# Patient Record
Sex: FEMALE | Race: WHITE | HISPANIC OR LATINO | Employment: UNEMPLOYED
[De-identification: names, ages, dates, MRNs, and addresses within clinical notes are randomized per-mention and may not be internally consistent; named-entity substitution may affect disease eponyms.]

---

## 2017-10-30 ENCOUNTER — RECORDS - HEALTHEAST (OUTPATIENT)
Dept: ADMINISTRATIVE | Facility: OTHER | Age: 57
End: 2017-10-30

## 2017-11-06 ENCOUNTER — HOSPITAL ENCOUNTER (OUTPATIENT)
Dept: NUCLEAR MEDICINE | Facility: CLINIC | Age: 57
Discharge: HOME | End: 2017-11-06
Attending: PHYSICIAN ASSISTANT

## 2017-11-06 ENCOUNTER — COMMUNICATION - HEALTHEAST (OUTPATIENT)
Dept: TELEHEALTH | Facility: CLINIC | Age: 57
End: 2017-11-06

## 2017-11-06 DIAGNOSIS — R10.11 RUQ PAIN: ICD-10-CM

## 2018-06-29 ENCOUNTER — RECORDS - HEALTHEAST (OUTPATIENT)
Dept: ADMINISTRATIVE | Facility: OTHER | Age: 58
End: 2018-06-29

## 2018-07-03 ENCOUNTER — AMBULATORY - HEALTHEAST (OUTPATIENT)
Dept: SURGERY | Facility: CLINIC | Age: 58
End: 2018-07-03

## 2018-07-03 DIAGNOSIS — R22.1 LOCALIZED SWELLING, MASS AND LUMP, NECK: ICD-10-CM

## 2018-07-06 ENCOUNTER — COMMUNICATION - HEALTHEAST (OUTPATIENT)
Dept: ADMINISTRATIVE | Facility: CLINIC | Age: 58
End: 2018-07-06

## 2018-07-17 ENCOUNTER — COMMUNICATION - HEALTHEAST (OUTPATIENT)
Dept: SURGERY | Facility: CLINIC | Age: 58
End: 2018-07-17

## 2018-08-01 ENCOUNTER — OFFICE VISIT - HEALTHEAST (OUTPATIENT)
Dept: SURGERY | Facility: CLINIC | Age: 58
End: 2018-08-01

## 2018-08-01 DIAGNOSIS — R10.13 ABDOMINAL PAIN, EPIGASTRIC: ICD-10-CM

## 2018-08-01 DIAGNOSIS — K21.9 GASTROESOPHAGEAL REFLUX DISEASE, ESOPHAGITIS PRESENCE NOT SPECIFIED: ICD-10-CM

## 2018-08-01 DIAGNOSIS — R10.11 RUQ PAIN: ICD-10-CM

## 2018-08-01 ASSESSMENT — MIFFLIN-ST. JEOR: SCORE: 1366.94

## 2019-06-03 ENCOUNTER — RECORDS - HEALTHEAST (OUTPATIENT)
Dept: ADMINISTRATIVE | Facility: OTHER | Age: 59
End: 2019-06-03

## 2019-06-10 ENCOUNTER — HOSPITAL ENCOUNTER (OUTPATIENT)
Dept: CT IMAGING | Facility: CLINIC | Age: 59
Discharge: HOME | End: 2019-06-10
Attending: INTERNAL MEDICINE

## 2019-06-10 DIAGNOSIS — R10.11 RUQ PAIN: ICD-10-CM

## 2021-03-23 ENCOUNTER — RECORDS - HEALTHEAST (OUTPATIENT)
Dept: ADMINISTRATIVE | Facility: OTHER | Age: 61
End: 2021-03-23

## 2021-04-21 ENCOUNTER — COMMUNICATION - HEALTHEAST (OUTPATIENT)
Dept: TELEHEALTH | Facility: CLINIC | Age: 61
End: 2021-04-21

## 2021-04-21 ENCOUNTER — HOSPITAL ENCOUNTER (OUTPATIENT)
Dept: NUCLEAR MEDICINE | Facility: CLINIC | Age: 61
Discharge: HOME | End: 2021-04-21
Attending: INTERNAL MEDICINE

## 2021-04-21 DIAGNOSIS — R10.11 RUQ PAIN: ICD-10-CM

## 2021-05-17 ENCOUNTER — AMBULATORY - HEALTHEAST (OUTPATIENT)
Dept: SURGERY | Facility: CLINIC | Age: 61
End: 2021-05-17

## 2021-05-17 ENCOUNTER — RECORDS - HEALTHEAST (OUTPATIENT)
Dept: ADMINISTRATIVE | Facility: OTHER | Age: 61
End: 2021-05-17

## 2021-05-17 DIAGNOSIS — Z11.59 ENCOUNTER FOR SCREENING FOR OTHER VIRAL DISEASES: ICD-10-CM

## 2021-05-19 ENCOUNTER — AMBULATORY - HEALTHEAST (OUTPATIENT)
Dept: MULTI SPECIALTY CLINIC | Facility: CLINIC | Age: 61
End: 2021-05-19

## 2021-05-19 ENCOUNTER — TRANSFERRED RECORDS (OUTPATIENT)
Dept: HEALTH INFORMATION MANAGEMENT | Facility: CLINIC | Age: 61
End: 2021-05-19

## 2021-05-19 LAB
CREAT SERPL-MCNC: 0.72 MG/DL (ref 0.5–0.99)
GFR SERPL CREATININE-BSD FRML MDRD: 91 ML/MIN/1.73M2
GLUCOSE SERPL-MCNC: 126 MG/DL (ref 65–99)
HBA1C MFR BLD: 6.2 % (ref 0–5.7)
POTASSIUM SERPL-SCNC: 4 MMOL/L (ref 3.5–5.3)

## 2021-05-22 ENCOUNTER — AMBULATORY - HEALTHEAST (OUTPATIENT)
Dept: FAMILY MEDICINE | Facility: CLINIC | Age: 61
End: 2021-05-22

## 2021-05-22 DIAGNOSIS — Z11.59 ENCOUNTER FOR SCREENING FOR OTHER VIRAL DISEASES: ICD-10-CM

## 2021-05-22 LAB
SARS-COV-2 PCR COMMENT: NORMAL
SARS-COV-2 RNA SPEC QL NAA+PROBE: NEGATIVE
SARS-COV-2 VIRUS SPECIMEN SOURCE: NORMAL

## 2021-05-23 ENCOUNTER — COMMUNICATION - HEALTHEAST (OUTPATIENT)
Dept: SCHEDULING | Facility: CLINIC | Age: 61
End: 2021-05-23

## 2021-05-24 ENCOUNTER — RECORDS - HEALTHEAST (OUTPATIENT)
Dept: ADMINISTRATIVE | Facility: OTHER | Age: 61
End: 2021-05-24

## 2021-05-24 ASSESSMENT — MIFFLIN-ST. JEOR: SCORE: 1294.23

## 2021-05-25 ENCOUNTER — RECORDS - HEALTHEAST (OUTPATIENT)
Dept: ADMINISTRATIVE | Facility: OTHER | Age: 61
End: 2021-05-25

## 2021-05-26 ENCOUNTER — ANESTHESIA - HEALTHEAST (OUTPATIENT)
Dept: SURGERY | Facility: CLINIC | Age: 61
End: 2021-05-26

## 2021-05-26 ENCOUNTER — SURGERY - HEALTHEAST (OUTPATIENT)
Dept: SURGERY | Facility: CLINIC | Age: 61
End: 2021-05-26

## 2021-05-26 ENCOUNTER — RECORDS - HEALTHEAST (OUTPATIENT)
Dept: ADMINISTRATIVE | Facility: OTHER | Age: 61
End: 2021-05-26

## 2021-05-26 ASSESSMENT — MIFFLIN-ST. JEOR: SCORE: 1263.38

## 2021-05-31 ENCOUNTER — RECORDS - HEALTHEAST (OUTPATIENT)
Dept: ADMINISTRATIVE | Facility: CLINIC | Age: 61
End: 2021-05-31

## 2021-06-01 VITALS — WEIGHT: 172 LBS | BODY MASS INDEX: 27.64 KG/M2 | HEIGHT: 66 IN

## 2021-06-03 ENCOUNTER — COMMUNICATION - HEALTHEAST (OUTPATIENT)
Dept: INFECTIOUS DISEASES | Facility: CLINIC | Age: 61
End: 2021-06-03

## 2021-06-05 VITALS — WEIGHT: 161 LBS | BODY MASS INDEX: 25.99 KG/M2

## 2021-06-16 PROBLEM — E03.9 ACQUIRED HYPOTHYROIDISM: Chronic | Status: ACTIVE | Noted: 2021-05-29

## 2021-06-16 PROBLEM — R10.9 ABDOMINAL PAIN: Status: ACTIVE | Noted: 2021-05-29

## 2021-06-16 PROBLEM — E11.9 TYPE 2 DIABETES MELLITUS, WITHOUT LONG-TERM CURRENT USE OF INSULIN (H): Chronic | Status: ACTIVE | Noted: 2021-05-29

## 2021-06-19 NOTE — PROGRESS NOTES
GENERAL SURGICAL CONSULTATION    I was requested by Stephanie Becker MD to consult on this pt to evaluate them for Gall Bladder issues    HPI:  This is a 58 y.o. female here today with a recurrent RUQ pain.  The pain that she has is worse after eating. The pain also comes on when she is nervous.  She sometimes gets nausea but not vomiting.  She often gets abdominal pain and then she has difficulty with abdominal distention.   She has had these symptoms for years but they seem to be getting worse.  She has been assessed with GI and has been advised to stop taking milk and milk products.  She also is taking PPI's which she thinks is helpful but she is still having symptoms.  Patient also describes how she gets epigastric pain and [place] better taste within her mouth that makes her feel the gastric acid is coming up her esophagus.    Allergies:Penicillins    Past Medical History:   Diagnosis Date     CAP (community acquired pneumonia)      Hyperlipidemia      Hypertension        Past Surgical History:   Procedure Laterality Date     TUBAL LIGATION         CURRENT MEDS:  Current Outpatient Prescriptions   Medication Sig Dispense Refill     aspirin 325 MG tablet Take 325 mg by mouth daily.       atenolol (TENORMIN) 50 MG tablet Take 0.5 tablets (25 mg total) by mouth daily.  0     atorvastatin (LIPITOR) 10 MG tablet Take 10 mg by mouth daily.        losartan (COZAAR) 50 MG tablet Take 50 mg by mouth daily.       No current facility-administered medications for this visit.        Family History   Problem Relation Age of Onset     Heart attack Brother      Diabetes Mother      Hyperlipidemia Father      Family history is not pertinent to this patients Chief Complaint.     reports that she has never smoked. She has never used smokeless tobacco. She reports that she does not drink alcohol or use illicit drugs.    Review of Systems -   10 point Review of systems is negative except for; as mentioned above in HPI and PMHx    BP  "144/76 (Patient Site: Right Arm, Patient Position: Sitting, Cuff Size: Adult Regular)  Pulse 68  Ht 5' 6\" (1.676 m)  Wt 172 lb (78 kg)  SpO2 97%  BMI 27.76 kg/m2  Body mass index is 27.76 kg/(m^2).    EXAM:  GENERAL: Well developed female  HEENT: EOMI, Anicteric Sclera, Moist Mucous Membranes,  In Mouth the pt does not have redness or bleeding gums  CARDIOVASCULAR: RRR w/out murmur   CHEST/LUNG: Clear to Auscultation  ABDOMEN:  Non tender to palpation, +BS  MUSCULOSKELETAL:  No deformities with good range of motion in all extremities  NEURO: She is ambulatory with good strength in both legs.  HEME/LYMPH: No Cervical Adenopathy or tenderness.     IMAGES:  US ABDOMEN LIMITED  7/26/2018 5:29 PM     INDICATION: Pain  COMPARISON: 06/25/2016, CT 03/02/2014, hepatobiliary scan 11/06/2017     FINDINGS:  There is diffuse increased echotexture the liver. Gallbladder is well distended. No gallstones. In the fundus, focal echogenic foci noted along the wall with ringdown artifact. No discrete mass. No wall thickening. Common duct is normal at 3 mm. No   ascites. Visualized pancreas is normal. No evidence of hydronephrosis on the right.     IMPRESSION:   CONCLUSION:  1.  Patient is again noted to have hepatic steatosis.  2.  Findings suggestive of a small area of adenomyomatosis in the gallbladder. No evidence of cholelithiasis or cholecystitis.    NM HEPATOBILIARY W EF OR PHARM  11/6/2017 3:07 PM     INDICATION: Right upper quadrant pain  TECHNIQUE: Following the administration of 10 mCi of Tc-99m mebrofenin intravenously, anterior planar imaging of the abdomen was obtained for 60 minutes. 1.5 mcg of cholecystokinin analogue were then administered intravenously, and the gallbladder was   imaged for an additional 30 minutes to assess ejection fraction.  COMPARISON: None.     FINDINGS: Prompt uptake and excretion of the radiotracer by the liver. Bowel and gallbladder activity is observed confirming patency of the cystic and " common bile ducts. Following CCK administration, the gallbladder ejection fraction is calculated at   26%.     IMPRESSION:   CONCLUSION:  1. Patent cystic and common bile ducts.  2. Mildly low gallbladder ejection fraction.    Assessment/Plan:  The pt has intermittent RUQ abdominal pain.  She has some mild abnormalities on her US and HIDA scan.  Neither of which are problematic enough that I would operate if the pt were not so symptomatic,  However the pt has sever symptoms so we will work up her possible GERD with a Manometry and pH Probe study.  If the problem is GERD then we will discuss Tx for this.  If the manometry and pH probe studies are okay, then we will look back to the Cholecystectomy as a possible treatment.      Lefty Stallings MD  [facility]  [phone]

## 2021-06-25 NOTE — TELEPHONE ENCOUNTER
"I was contacted by the PCP clinic, [facility], Joy Layton, informing me that the pt was contacted by the [facility] informing that the pt has a \"small blood infection\". The pt's PCP office has no records. Joy Layton informed me that the pt did discontinue her cefdinir due to a rash. Joy did advise the pt to continue the cefdinir as she did not have any anaphylactic reactions. Joy did inform me that the pt is clinically better, she is afebrile, and her WBC is improved. I called the [facility], they will fax me the blood cultures. I received the blood cultures and reviewed them with Dr Santillan, who states only one BC is growing, and is likely a contaminant. Dr Santillan states that the cefdinir is the proper treatment. I called LM for Joy Layton informing that the pt should go to the ER if clinically worse, however the BC is likely a contaminant and the pt should stay the course of cefdinir and complete it. I advised that they call with questions.   "

## 2021-06-25 NOTE — TELEPHONE ENCOUNTER
Evelyn from [facility] called to confirm the below. I confirmed the pt should continue the cefdinir until complete, that the BC was a false positive, the pt does not need an ID f/up, and the pt should go to the ER if febrile, chills, or not feeling well.

## 2021-06-25 NOTE — TELEPHONE ENCOUNTER
Date: 6/10/2021 Status: Can   Time: 1:40 PM Length: 20   Visit Type: OFFICE VISIT [[id number]] Copay: $0.00   Provider: Ruth Santillan MD Department: INF INFECTIOUS DISEASE   Referring Provider: ODILIA BURCH CSN: [id number]   Notes: LVM to cancel appt, not needed, review encounter x 1 - - -  in clinic - hospital f/u.  avail at [phone] option 1 or please use iPad.   Made On:  Change Notes:  Change Notes:  Canceled: 6/3/2021 3:34 PM  6/4/2021 10:48 AM  6/4/2021 10:52 AM  6/4/2021 3:39 PM By:  By:  By:  By: CHI PRINCE BARBARA J YANG, CHI NOGUERA [id number]   Cancel Rsn: Provider Initiated ([facility] called to cancel appointment )

## 2021-06-26 NOTE — ANESTHESIA CARE TRANSFER NOTE
Last vitals:   Vitals:    05/26/21 1038   BP: (!) 214/98   Pulse: 86   Resp: 17   Temp: 36.7  C (98  F)   SpO2: 99%     Patient's level of consciousness is drowsy  Spontaneous respirations: yes  Maintains airway independently: yes  Dentition unchanged: yes  Oropharynx: oropharynx clear of all foreign objects    QCDR Measures:  ASA# 20 - Surgical Safety Checklist: WHO surgical safety checklist completed prior to induction    PQRS# 430 - Adult PONV Prevention: 4558F - Pt received => 2 anti-emetic agents (different classes) preop & intraop  ASA# 8 - Peds PONV Prevention: NA - Not pediatric patient, not GA or 2 or more risk factors NOT present  PQRS# 424 - Ayesha-op Temp Management: 4559F - At least one body temp DOCUMENTED => 35.5C or 95.9F within required timeframe  PQRS# 426 - PACU Transfer Protocol: - Transfer of care checklist used  ASA# 14 - Acute Post-op Pain: ASA14A - Patient experienced pain >= 7 out of 10

## 2021-06-26 NOTE — ANESTHESIA POSTPROCEDURE EVALUATION
Patient: Gale Nash  Procedure(s):  LAPAROSCOPIC CHOLECYSTECTOMY  Anesthesia type: general    Patient location: PACU  Last vitals:   Vitals Value Taken Time   /85 05/26/21 1330   Temp 36.7  C (98  F) 05/26/21 1140   Pulse 100 05/26/21 1352   Resp 14 05/26/21 1300   SpO2 93 % 05/26/21 1352   Vitals shown include unvalidated device data.  Post vital signs: stable  Level of consciousness: awake and responds to simple questions  Post-anesthesia pain: pain controlled  Post-anesthesia nausea and vomiting: no  Pulmonary: unassisted, return to baseline  Cardiovascular: stable and blood pressure at baseline  Hydration: adequate  Anesthetic events: no    QCDR Measures:  ASA# 11 - Ayesha-op Cardiac Arrest: ASA11B - Patient did NOT experience unanticipated cardiac arrest  ASA# 12 - Ayesha-op Mortality Rate: ASA12B - Patient did NOT die  ASA# 13 - PACU Re-Intubation Rate: ASA13B - Patient did NOT require a new airway mgmt  ASA# 10 - Composite Anes Safety: ASA10A - No serious adverse event    Additional Notes:

## 2021-07-06 VITALS
BODY MASS INDEX: 32.06 KG/M2 | BODY MASS INDEX: 26.66 KG/M2 | WEIGHT: 172 LBS | HEIGHT: 61 IN | WEIGHT: 165.2 LBS | BODY MASS INDEX: 27.76 KG/M2

## 2022-01-12 VITALS — BODY MASS INDEX: 31.19 KG/M2 | WEIGHT: 165.2 LBS | HEIGHT: 61 IN

## 2023-03-23 PROBLEM — B02.9 HERPES ZOSTER WITHOUT COMPLICATION: Status: ACTIVE | Noted: 2018-08-09

## 2023-03-24 ENCOUNTER — TELEPHONE (OUTPATIENT)
Dept: FAMILY MEDICINE | Facility: CLINIC | Age: 63
End: 2023-03-24

## 2023-03-24 ENCOUNTER — OFFICE VISIT (OUTPATIENT)
Dept: FAMILY MEDICINE | Facility: CLINIC | Age: 63
End: 2023-03-24
Payer: COMMERCIAL

## 2023-03-24 VITALS
WEIGHT: 165 LBS | RESPIRATION RATE: 18 BRPM | BODY MASS INDEX: 30.36 KG/M2 | TEMPERATURE: 98.4 F | HEART RATE: 63 BPM | HEIGHT: 62 IN | DIASTOLIC BLOOD PRESSURE: 78 MMHG | OXYGEN SATURATION: 98 % | SYSTOLIC BLOOD PRESSURE: 134 MMHG

## 2023-03-24 DIAGNOSIS — R10.13 ABDOMINAL PAIN, EPIGASTRIC: Primary | ICD-10-CM

## 2023-03-24 LAB
ALBUMIN SERPL BCG-MCNC: 4.4 G/DL (ref 3.5–5.2)
ALP SERPL-CCNC: 96 U/L (ref 35–104)
ALT SERPL W P-5'-P-CCNC: 17 U/L (ref 10–35)
ANION GAP SERPL CALCULATED.3IONS-SCNC: 10 MMOL/L (ref 7–15)
AST SERPL W P-5'-P-CCNC: 23 U/L (ref 10–35)
BASOPHILS # BLD AUTO: 0 10E3/UL (ref 0–0.2)
BASOPHILS NFR BLD AUTO: 0 %
BILIRUB SERPL-MCNC: 0.6 MG/DL
BUN SERPL-MCNC: 11.4 MG/DL (ref 8–23)
CALCIUM SERPL-MCNC: 9.5 MG/DL (ref 8.8–10.2)
CHLORIDE SERPL-SCNC: 105 MMOL/L (ref 98–107)
CREAT SERPL-MCNC: 0.61 MG/DL (ref 0.51–0.95)
DEPRECATED HCO3 PLAS-SCNC: 25 MMOL/L (ref 22–29)
EOSINOPHIL # BLD AUTO: 0.3 10E3/UL (ref 0–0.7)
EOSINOPHIL NFR BLD AUTO: 3 %
ERYTHROCYTE [DISTWIDTH] IN BLOOD BY AUTOMATED COUNT: 13.1 % (ref 10–15)
GFR SERPL CREATININE-BSD FRML MDRD: >90 ML/MIN/1.73M2
GLUCOSE SERPL-MCNC: 113 MG/DL (ref 70–99)
HCT VFR BLD AUTO: 39 % (ref 35–47)
HGB BLD-MCNC: 12.9 G/DL (ref 11.7–15.7)
IMM GRANULOCYTES # BLD: 0 10E3/UL
IMM GRANULOCYTES NFR BLD: 0 %
LIPASE SERPL-CCNC: 48 U/L (ref 13–60)
LYMPHOCYTES # BLD AUTO: 3.7 10E3/UL (ref 0.8–5.3)
LYMPHOCYTES NFR BLD AUTO: 38 %
MCH RBC QN AUTO: 28.5 PG (ref 26.5–33)
MCHC RBC AUTO-ENTMCNC: 33.1 G/DL (ref 31.5–36.5)
MCV RBC AUTO: 86 FL (ref 78–100)
MONOCYTES # BLD AUTO: 0.5 10E3/UL (ref 0–1.3)
MONOCYTES NFR BLD AUTO: 5 %
NEUTROPHILS # BLD AUTO: 5.3 10E3/UL (ref 1.6–8.3)
NEUTROPHILS NFR BLD AUTO: 54 %
PLATELET # BLD AUTO: 318 10E3/UL (ref 150–450)
POTASSIUM SERPL-SCNC: 4.5 MMOL/L (ref 3.4–5.3)
PROT SERPL-MCNC: 7.5 G/DL (ref 6.4–8.3)
RBC # BLD AUTO: 4.53 10E6/UL (ref 3.8–5.2)
SODIUM SERPL-SCNC: 140 MMOL/L (ref 136–145)
TSH SERPL DL<=0.005 MIU/L-ACNC: 3.41 UIU/ML (ref 0.3–4.2)
WBC # BLD AUTO: 9.8 10E3/UL (ref 4–11)

## 2023-03-24 PROCEDURE — 99214 OFFICE O/P EST MOD 30 MIN: CPT | Performed by: FAMILY MEDICINE

## 2023-03-24 PROCEDURE — 36415 COLL VENOUS BLD VENIPUNCTURE: CPT | Performed by: FAMILY MEDICINE

## 2023-03-24 PROCEDURE — 80050 GENERAL HEALTH PANEL: CPT | Performed by: FAMILY MEDICINE

## 2023-03-24 PROCEDURE — 83690 ASSAY OF LIPASE: CPT | Performed by: FAMILY MEDICINE

## 2023-03-24 RX ORDER — LOSARTAN POTASSIUM 50 MG/1
50 TABLET ORAL DAILY
COMMUNITY
End: 2023-03-24 | Stop reason: DRUGHIGH

## 2023-03-24 RX ORDER — TRAZODONE HYDROCHLORIDE 50 MG/1
50 TABLET, FILM COATED ORAL AT BEDTIME
COMMUNITY
End: 2023-03-31

## 2023-03-24 RX ORDER — ALBUTEROL SULFATE 90 UG/1
2 AEROSOL, METERED RESPIRATORY (INHALATION) EVERY 6 HOURS PRN
COMMUNITY
End: 2023-03-31

## 2023-03-24 RX ORDER — METHOCARBAMOL 750 MG/1
750 TABLET, FILM COATED ORAL
COMMUNITY
Start: 2023-01-24 | End: 2023-03-24

## 2023-03-24 RX ORDER — NAPROXEN 500 MG/1
500 TABLET ORAL
COMMUNITY
Start: 2022-07-18 | End: 2023-03-24

## 2023-03-24 ASSESSMENT — PAIN SCALES - GENERAL: PAINLEVEL: MILD PAIN (3)

## 2023-03-24 NOTE — TELEPHONE ENCOUNTER
PA Initiation    Medication: omeprazole (PRILOSEC) 20 MG DR capsule  Insurance Company:  [facility]  Pharm[facility] Filling the Rx:  [facility]  Filling Pharmacy Phone:  [phone]  Filling Pharmacy Fax:  [phone]  Start Date:  03/24/2023

## 2023-03-24 NOTE — PROGRESS NOTES
"Gale Nash  /78   Pulse 63   Temp 98.4  F (36.9  C)   Resp 18   Ht 1.562 m (5' 1.5\")   Wt 74.8 kg (165 lb)   SpO2 98%   BMI 30.67 kg/m       Assessment/Plan:                Gale was seen today for abdominal pain.    Diagnoses and all orders for this visit:    Abdominal pain, epigastric  -     Comprehensive metabolic panel  -     CBC with Platelets & Differential  -     Lipase  -     TSH with free T4 reflex  -     US Abdomen Complete; Future  -     omeprazole (PRILOSEC) 20 MG DR capsule; Take 1 capsule (20 mg) by mouth 2 times daily    Other orders  -     PRIMARY CARE FOLLOW-UP SCHEDULING; Future         DISCUSSION  Patient does not have red flag symptoms to warrant an emergent evaluation but she does have persistent pain that does require further consideration.  Based on location and characteristics I am concerned about the possibility of common duct stones although previous lab testing and imaging did not indicate this.  For this reason we will obtain repeat lab studies as well as a right upper quadrant ultrasound as a starting point.  Other considerations which are also quite likely are gastric irritation.  We will have her continue omeprazole, will increase to twice daily.  Reviewed avoidance of NSAIDs and utilizing acetaminophen in place of this if needed for any discomfort.  Since she has been on PPI chronically will not test for H. pylori at this time but would give consideration to H. pylori testing as a potential cause.  We will also give strong consideration to endoscopy in general for evaluation should other modes of evaluation previously described not yield any potential etiology for her pain.  Patient is cautioned that should she have acute worsening of her pain especially if she is not able to eat or drink she should return to the emergency department for evaluation.    Patient expresses gratitude for the explanation of the plan and follow-up arrangements.  I will see her in 1 " week to reevaluate the concern and the information we have available at that time to help plan for further action.    I did  briefly review her previous office visit notes from her recent primary care visit do not feel that there is any significant concern related to blood pressure or diabetes at this time but will keep all of these considerations in mind moving forward as well.  Subjective:     HPI:    Gale Nash is a 63 year old female is here today concerned regarding abdominal pain.  This is her first visit within our care system.  Review of [facility] care everywhere indicates that she had been seen for a routine primary care visit in January 2023 with diagnosis of diabetes and an A1c of 6.5.  Other medical history includes hypertension, hypothyroidism, dyslipidemia.  She is [place]-speaking telephone  is used for the visit today.    She had a visit at the urgency room on March 4, 2023 where she had lab testing and a CT scan obtained that did not show any distinct etiology for the pain.  They recommended dietary modification, continued use of omeprazole and Tylenol for pain.  She had recommendation to follow with primary care provider.  She had a previous visit scheduled at this clinic but was not able to be seen due to logistical considerations.    She reports to me that she has had abdominal pain primarily right-sided but to some extent the epigastrium.  She always has a discomfort that is present but sometimes gets a lot worse especially with eating.  Almost eating anything at this point tends to cause this.  When she was seen in the emergency room she was placed on omeprazole she does not feel this has been helpful.  She has been taking the medication every morning.  Patient reports she still has an appetite at times when she gets pain in the appetite goes down.  I do not note any weight loss in terms of information recorded.  She does report she had some diarrhea initially at the onset of  her pain about 2 months ago but has really not dealt with diarrhea on an ongoing basis.  She does not note any change in the color of bowel movements.  She does not report nausea or any episodes of vomiting.  Occasionally gets headaches but really does not have other symptoms to report.  She is very bothered by this.  Does report a past history 2 years ago of having similar pain and was ultimately diagnosed with cholecystectomy after a rather prolonged course of pain and a work-up.  After removal of gallbladder pain resolved for a while until it recurred more recently.  Patient does occasionally take ibuprofen but not regularly or very often.  She has been using some water with baking soda in it at times which helps to settle her stomach.  She does not otherwise eat or drink a lot of foods we are able to identify today which would specifically place her at risk for stomach irritation/acid reflux.    Past surgical history includes cholecystectomy but no other surgeries.  Social history she has never been a smoker she does not drink alcohol.  She is currently a homemaker.                ROS:  Complete review of systems is obtained.  Other than the specific considerations noted above complete review of systems is negative.          Objective:   Medications:  Current Outpatient Medications   Medication     acetaminophen (TYLENOL) 500 MG tablet     albuterol (PROAIR HFA/PROVENTIL HFA/VENTOLIN HFA) 108 (90 Base) MCG/ACT inhaler     aspirin (ASA) 325 MG EC tablet     atorvastatin (LIPITOR) 20 MG tablet     blood glucose (CONTOUR NEXT TEST) test strip     levothyroxine (SYNTHROID, LEVOTHROID) 25 MCG tablet     losartan (COZAAR) 100 MG tablet     metFORMIN (GLUCOPHAGE-XR) 500 MG 24 hr tablet     omeprazole (PRILOSEC) 20 MG DR capsule     traZODone (DESYREL) 50 MG tablet     No current facility-administered medications for this visit.        Allergies:     Allergies   Allergen Reactions     Penicillins Hives and Rash      "Tolerates cephalosporins     Clarithromycin Unknown     Per H=P, patient not aware     Flagyl [Metronidazole] Unknown     From H+P, patient not aware     Levofloxacin Unknown     From Pre op H+P, patient not aware     Cephalosporins Rash        Social History     Socioeconomic History     Marital status:      Spouse name: Not on file     Number of children: Not on file     Years of education: Not on file     Highest education level: Not on file   Occupational History     Not on file   Tobacco Use     Smoking status: Never     Smokeless tobacco: Never   Vaping Use     Vaping Use: Never used   Substance and Sexual Activity     Alcohol use: No     Drug use: No     Sexual activity: Not on file   Other Topics Concern     Not on file   Social History Narrative     Not on file     Social Determinants of Health     Financial Resource Strain: Not on file   Food Insecurity: Not on file   Transportation Needs: Not on file   Physical Activity: Not on file   Stress: Not on file   Social Connections: Not on file   Intimate Partner Violence: Not on file   Housing Stability: Not on file       Family History   Problem Relation Age of Onset     Coronary Artery Disease Brother      Diabetes Mother      Hyperlipidemia Father         Most Recent Immunizations   Administered Date(s) Administered     COVID-19 Vaccine 12+ (Pfizer) 03/04/2022     FLU 6-35 months 12/29/2013     Influenza Vaccine >6 months (Alfuria,Fluzone) 12/28/2021     TDAP (Adacel,Boostrix) 08/08/2016        Wt Readings from Last 3 Encounters:   03/24/23 74.8 kg (165 lb)   05/26/21 74.9 kg (165 lb 3.2 oz)   05/26/21 74.9 kg (165 lb 3.2 oz)        BP Readings from Last 6 Encounters:   03/24/23 134/78        Hemoglobin A1C   Date Value Ref Range Status   05/19/2021 6.2 (A) <6.0 % Final              PHYSICAL EXAM:    /78   Pulse 63   Temp 98.4  F (36.9  C)   Resp 18   Ht 1.562 m (5' 1.5\")   Wt 74.8 kg (165 lb)   SpO2 98%   BMI 30.67 kg/m           General " Appearance:    Alert, cooperative, no distress   Eyes:   No scleral icterus or conjunctival irritation       Ears:    Normal TM's and external ear canals, both ears   Throat:   Lips, mucosa, and tongue normal; teeth and gums normal   Neck:   Supple, symmetrical, trachea midline, no adenopathy;        thyroid:  No enlargement/tenderness/nodules   Lungs:     Clear to auscultation bilaterally, respirations unlabored, no wheezes or crackles   Heart:    Regular rate and rhythm,  No murmur   Abdomen:    Soft, no distention, initially as I palpate she does not report much pain but then has more significant pain in the epigastrium almost more so after the pushing and leading up and with the initial palpation itself.  She reports the pain is more moderate in severity.     Extremities:  No edema, no joint swelling or redness, no evidence of any injuries   Skin:  No concerning skin findings, no suspicious moles, no rashes   Neurologic:  On gross examination there is no motor or sensory deficit.  Patient walks with a normal gait

## 2023-03-27 NOTE — RESULT ENCOUNTER NOTE
Can you call her please for me Jenni. Thank you.  Okay to leave a nice voicemail if you want.   Reminder she is seeing Dr Calderon on Friday.  Thank you

## 2023-03-28 NOTE — TELEPHONE ENCOUNTER
Central Prior Authorization Team   Phone: [phone]    PA Initiation    Medication: omeprazole (PRILOSEC) 20 MG DR capsule  Insurance Company: [employer] [facility] - Phone [phone] Fax [phone]  Pharmacy Filling the Rx: [id number] [place], [place] - [facility] [address] AT [place] [address]  Filling Pharmacy Phone: [phone]  Filling Pharmacy Fax:    Start Date: 3/28/2023

## 2023-03-29 ENCOUNTER — HOSPITAL ENCOUNTER (OUTPATIENT)
Dept: ULTRASOUND IMAGING | Facility: CLINIC | Age: 63
Discharge: HOME | End: 2023-03-29
Attending: FAMILY MEDICINE | Admitting: FAMILY MEDICINE
Payer: COMMERCIAL

## 2023-03-29 DIAGNOSIS — R10.13 ABDOMINAL PAIN, EPIGASTRIC: ICD-10-CM

## 2023-03-29 PROCEDURE — 76700 US EXAM ABDOM COMPLETE: CPT

## 2023-03-29 NOTE — TELEPHONE ENCOUNTER
PRIOR AUTHORIZATION DENIED    Medication: omeprazole (PRILOSEC) 20 MG DR capsule    Denial Date: 3/29/2023    Denial Rational:               Appeal Information: Review the plan's reasons for denial listed above. Please utilize that information to complete letter and provide specific, detailed clinical information/rationale of your patient's health status to address their denial reasons.

## 2023-03-31 ENCOUNTER — OFFICE VISIT (OUTPATIENT)
Dept: FAMILY MEDICINE | Facility: CLINIC | Age: 63
End: 2023-03-31
Payer: COMMERCIAL

## 2023-03-31 VITALS
TEMPERATURE: 98.1 F | WEIGHT: 168.3 LBS | RESPIRATION RATE: 14 BRPM | HEART RATE: 80 BPM | BODY MASS INDEX: 31.29 KG/M2 | SYSTOLIC BLOOD PRESSURE: 135 MMHG | OXYGEN SATURATION: 94 % | DIASTOLIC BLOOD PRESSURE: 73 MMHG

## 2023-03-31 DIAGNOSIS — R10.13 ABDOMINAL PAIN, EPIGASTRIC: ICD-10-CM

## 2023-03-31 PROCEDURE — 99214 OFFICE O/P EST MOD 30 MIN: CPT | Performed by: FAMILY MEDICINE

## 2023-03-31 RX ORDER — SUCRALFATE ORAL 1 G/10ML
1 SUSPENSION ORAL 4 TIMES DAILY PRN
Qty: 414 ML | Refills: 1 | Status: SHIPPED | OUTPATIENT
Start: 2023-03-31 | End: 2023-04-28

## 2023-03-31 RX ORDER — FAMOTIDINE 40 MG/1
40 TABLET, FILM COATED ORAL 2 TIMES DAILY
Qty: 60 TABLET | Refills: 4 | Status: SHIPPED | OUTPATIENT
Start: 2023-03-31 | End: 2023-05-31

## 2023-03-31 ASSESSMENT — PAIN SCALES - GENERAL: PAINLEVEL: NO PAIN (0)

## 2023-03-31 NOTE — PROGRESS NOTES
Gale Nash  /73 (BP Location: Left arm, Patient Position: Sitting, Cuff Size: Adult Regular)   Pulse 80   Temp 98.1  F (36.7  C) (Oral)   Resp 14   Wt 76.3 kg (168 lb 4.8 oz)   SpO2 94%   BMI 31.29 kg/m       Assessment/Plan:                Gale was seen today for recheck medication and follow up.    Diagnoses and all orders for this visit:    Abdominal pain, epigastric  -     Helicobacter pylori Antigen Stool; Future  -     famotidine (PEPCID) 40 MG tablet; Take 1 tablet (40 mg) by mouth 2 times daily  -     sucralfate (CARAFATE) 1 GM/10ML suspension; Take 10 mLs (1 g) by mouth 4 times daily as needed (epigastric pain)  -     Adult GI  Referral - Procedure Only; Future         DISCUSSION  I suspect gastritis as the cause of pain.  Rule out H. pylori.  Stop PPI for the time being.  Use twice daily famotidine and sucralfate to try to control symptoms.  Stop taking naproxen use acetaminophen if needed for pain.  Will refer for endoscopy given the complexity of the situation.    Follow-up with me in 2 weeks.    Discussed dietary considerations the importance of overall controlling diabetes to help with hepatic steatosis and that this is a condition that needs to be monitored.    The significance of the renal atrophy is uncertain.  We will continue to monitor, she had normal kidney function measurements on most recent lab panel.  Subjective:     HPI:    Gale Nash is a 63 year old female is here today for follow-up on epigastric/right upper quadrant pain.  Please refer to last clinic visit note.  She had an ultrasound that showed steatosis of the liver and potential bilateral renal atrophy.  There is no source of her pain.  She had laboratory testing that showed only mildly elevated blood sugar and no other significant concerns.    She did have a history of cholecystectomy due to gallbladder dysfunction.  Patient reported a similar pain at the time of that concern.  She was  very worried about a similar type of process although the gallbladder had been removed.  Provided reassurance today there is no evidence of any common duct stones.  There is no evidence of any other significant pathology.  Notably even prior to her lab testing and ultrasound she had been seen in the emergency department and underwent a CT scan and previous lab testing.  Discussed with her    That I think the source of her pain is gastric irritation or possibly irritation in the first part of the small intestine.  She has been on omeprazole which she does not find helpful.  Unfortunately she is occasionally using naproxen.  We discussed today stopping this.  We discussed discontinuation of the PPI so that we can obtain H. pylori testing.  Discussed that we would use famotidine twice daily along with sucralfate to see if this would help control symptoms in the interim.  She has not had weight loss.  She denies nausea vomiting diarrhea or any change in bowel movements.  She has had no anemia concerns on 2 successive hemogram tests obtained in early in mid March.    ROS:  Complete review of systems is obtained.  Other than the specific considerations noted above complete review of systems is negative.          Objective:   Medications:  Current Outpatient Medications   Medication     acetaminophen (TYLENOL) 500 MG tablet     atorvastatin (LIPITOR) 20 MG tablet     blood glucose (CONTOUR NEXT TEST) test strip     famotidine (PEPCID) 40 MG tablet     levothyroxine (SYNTHROID, LEVOTHROID) 25 MCG tablet     metFORMIN (GLUCOPHAGE-XR) 500 MG 24 hr tablet     omeprazole (PRILOSEC) 20 MG DR capsule     sucralfate (CARAFATE) 1 GM/10ML suspension     No current facility-administered medications for this visit.        Allergies:     Allergies   Allergen Reactions     Penicillins Hives and Rash     Tolerates cephalosporins     Clarithromycin Unknown     Per H=P, patient not aware     Flagyl [Metronidazole] Unknown     From H+P,  patient not aware     Levofloxacin Unknown     From Pre op H+P, patient not aware     Cephalosporins Rash        Social History     Socioeconomic History     Marital status:      Spouse name: Not on file     Number of children: Not on file     Years of education: Not on file     Highest education level: Not on file   Occupational History     Not on file   Tobacco Use     Smoking status: Never     Smokeless tobacco: Never   Vaping Use     Vaping Use: Never used   Substance and Sexual Activity     Alcohol use: No     Drug use: No     Sexual activity: Not on file   Other Topics Concern     Not on file   Social History Narrative     Not on file     Social Determinants of Health     Financial Resource Strain: Not on file   Food Insecurity: Not on file   Transportation Needs: Not on file   Physical Activity: Not on file   Stress: Not on file   Social Connections: Not on file   Intimate Partner Violence: Not on file   Housing Stability: Not on file       Family History   Problem Relation Age of Onset     Coronary Artery Disease Brother      Diabetes Mother      Hyperlipidemia Father         Most Recent Immunizations   Administered Date(s) Administered     COVID-19 Vaccine 12+ (Pfizer) 03/04/2022     FLU 6-35 months 12/29/2013     Influenza Vaccine >6 months (Alfuria,Fluzone) 12/28/2021     TDAP (Adacel,Boostrix) 08/08/2016        Wt Readings from Last 3 Encounters:   03/31/23 76.3 kg (168 lb 4.8 oz)   03/24/23 74.8 kg (165 lb)   05/26/21 74.9 kg (165 lb 3.2 oz)        BP Readings from Last 6 Encounters:   03/31/23 135/73   03/24/23 134/78        Hemoglobin A1C   Date Value Ref Range Status   05/19/2021 6.2 (A) <6.0 % Final              PHYSICAL EXAM:    /73 (BP Location: Left arm, Patient Position: Sitting, Cuff Size: Adult Regular)   Pulse 80   Temp 98.1  F (36.7  C) (Oral)   Resp 14   Wt 76.3 kg (168 lb 4.8 oz)   SpO2 94%   BMI 31.29 kg/m           General Appearance:    Alert, cooperative, no distress    Eyes:   No scleral icterus or conjunctival irritation       Lungs:     Clear to auscultation bilaterally, respirations unlabored, no wheezes or crackles   Heart:    Regular rate and rhythm,  No murmur   Abdomen:    Soft, no distention, moderate epigastric tenderness no rebound tenderness or guarding no other areas of significant tenderness currently     Extremities:  No edema, no joint swelling or redness, no evidence of any injuries   Skin:  No concerning skin findings, no suspicious moles, no rashes   Neurologic:  On gross examination there is no motor or sensory deficit.  Patient walks with a normal gait

## 2023-03-31 NOTE — PATIENT INSTRUCTIONS
Ney Beasley.  Que chahal florina gordon.  Planeamos verlo nuevamente el lunes 17 de nba a la 1:00 p. m. con el Dr. Calderon. El laboratorio repasará qué hacer con stone muestra de heces.  Si tiene alguna pregunta, llámenos al [phone]. Qué tenga un buen fin de semana.  Keenan y el Dr. Calderon. :)

## 2023-04-03 ENCOUNTER — NURSE TRIAGE (OUTPATIENT)
Dept: NURSING | Facility: CLINIC | Age: 63
End: 2023-04-03
Payer: COMMERCIAL

## 2023-04-03 ENCOUNTER — TELEPHONE (OUTPATIENT)
Dept: FAMILY MEDICINE | Facility: CLINIC | Age: 63
End: 2023-04-03
Payer: COMMERCIAL

## 2023-04-03 DIAGNOSIS — R10.13 EPIGASTRIC PAIN: Primary | ICD-10-CM

## 2023-04-03 NOTE — TELEPHONE ENCOUNTER
PA Initiation    Medication: sucralfate (CARAFATE) 1 GM/10ML suspension  Insurance Company:  [employer] [facility]  Pharmacy Filling the Rx:  [facility]  Filling Pharmacy Phone:  [phone]  Filling Pharmacy Fax:  [phone]  Start Date:  4/3/2023

## 2023-04-03 NOTE — TELEPHONE ENCOUNTER
I would recommend patient pay out of pocket as not expensive. Can pick OTC as well. This is likely why it was not covered.    Moisés Rihcardson MD

## 2023-04-03 NOTE — TELEPHONE ENCOUNTER
Pharmacy told patient medication is over $200, writer did educate on using goodrx which would bring the price down to about $60 but patient stated the pharmacy told her that it was still more than $200. Patient is not willing to pay this and would like a different medication if possible.     Ruth Villar, BSN, RN  [facility]

## 2023-04-03 NOTE — TELEPHONE ENCOUNTER
Nurse Triage SBAR    Is this a 2nd Level Triage? NO    Situation: Patient states that a medication that she was prescribed last week never made it to the pharmacy    Background:  call- seen in office 3/31/23 Dr Calderon- abdominal pain    Assessment: seen on Friday- pharmacy states that they did not get one of the medications    Disp Refills Start End JAYNA   sucralfate (CARAFATE) 1 GM/10ML suspension 414 mL 1 3/31/2023  --   Sig - Route: Take 10 mLs (1 g) by mouth 4 times daily as needed (epigastric pain) - Oral   Sent to pharmacy as: Sucralfate 1 GM/10ML Oral Suspension (CARAFATE)   Class: E-Prescribe   Order: [id number]   E-Prescribing Status: Receipt confirmed by pharmacy (3/31/2023  1:38 PM CDT)       Protocol Recommended Disposition:   Discuss With PCP And Callback By Nurse Within 1 Hour    Recommendation: Advised that a message will be sent to the clinic to address today. Patient states she would be able to  the medication later today.      Routed to provider    Does the patient meet one of the following criteria for ADS visit consideration? No    Reason for Disposition    Prescription not at pharmacy and was prescribed by PCP recently  (Exception: triager has access to EMR and prescription is recorded there. Go to Home Care and confirm for pharmacy.)     Clinic to address outbound work    Additional Information    Negative: Intentional drug overdose and suicidal thoughts or ideas    Negative: Drug overdose and triager unable to answer question    Negative: Caller requesting a renewal or refill of a medicine patient is currently taking    Negative: Caller requesting information unrelated to medicine    Negative: Caller requesting information about COVID-19 Vaccine    Negative: Caller requesting information about Emergency Contraception    Negative: Caller requesting information about Combined Birth Control Pills    Negative: Caller requesting information about Progestin Birth Control Pills     Negative: Caller requesting information about Post-Op pain or medicines    Negative: Caller requesting a prescription antibiotic (such as penicillin) for Strep throat and has a positive culture result    Negative: Caller requesting a prescription anti-viral med (such as Tamiflu) and has influenza (flu) symptoms    Negative: Immunization reaction suspected    Negative: Rash while taking a medicine or within 3 days of stopping it    Negative: Asthma and having symptoms of asthma (cough, wheezing, etc.)    Negative: Symptom of illness (e.g., headache, abdominal pain, earache, vomiting) that are more than mild    Negative: Breastfeeding questions about mother's medicines and diet    Negative: MORE THAN A DOUBLE DOSE of a prescription or over-the-counter (OTC) drug    Negative: DOUBLE DOSE (an extra dose or lesser amount) of prescription drug and any symptoms (e.g., dizziness, nausea, pain, sleepiness)    Negative: DOUBLE DOSE (an extra dose or lesser amount) of over-the-counter (OTC) drug and any symptoms (e.g., dizziness, nausea, pain, sleepiness)    Negative: Took another person's prescription drug    Negative: DOUBLE DOSE (an extra dose or lesser amount) of prescription drug and NO symptoms  (Exception: A double dose of antibiotics.)    Negative: Diabetes drug error or overdose (e.g., took wrong type of insulin or took extra dose)    Negative: Caller has medication question about med NOT prescribed by PCP and triager unable to answer question (e.g., compatibility with other med, storage)    Protocols used: MEDICATION QUESTION CALL-A-[place]

## 2023-04-03 NOTE — TELEPHONE ENCOUNTER
Writer called and spoke to pharmacy who stated the Sucralfate is not covered by insurance and stated they did send a request back asking if provider would like to do a PA on the medication or prescribe something different.    Please advise on behalf of Dr. Calderon.     Ruth Villar, BSN, RN  [facility]

## 2023-04-04 RX ORDER — SUCRALFATE 1 G/1
1 TABLET ORAL 4 TIMES DAILY
Qty: 120 TABLET | Refills: 0 | Status: SHIPPED | OUTPATIENT
Start: 2023-04-04 | End: 2023-04-28

## 2023-04-04 NOTE — TELEPHONE ENCOUNTER
Provider Recommendation Follow Up:   Reached patient/caregiver. Informed of provider's recommendations. Patient verbalized understanding and agrees with the plan.     ISA Fenton, RN  [facility]

## 2023-04-04 NOTE — TELEPHONE ENCOUNTER
I put in Carafate tabs in stead to see if these are better covered. I do not have an alternative if too expensive. Patient shoulder be seen    Moisés Richardson MD

## 2023-04-10 ENCOUNTER — APPOINTMENT (OUTPATIENT)
Dept: LAB | Facility: CLINIC | Age: 63
End: 2023-04-10
Payer: COMMERCIAL

## 2023-04-10 PROCEDURE — 87338 HPYLORI STOOL AG IA: CPT | Performed by: FAMILY MEDICINE

## 2023-04-12 LAB — H PYLORI AG STL QL IA: POSITIVE

## 2023-04-28 ENCOUNTER — OFFICE VISIT (OUTPATIENT)
Dept: FAMILY MEDICINE | Facility: CLINIC | Age: 63
End: 2023-04-28
Payer: COMMERCIAL

## 2023-04-28 VITALS
WEIGHT: 163.9 LBS | DIASTOLIC BLOOD PRESSURE: 78 MMHG | OXYGEN SATURATION: 98 % | RESPIRATION RATE: 18 BRPM | HEART RATE: 69 BPM | BODY MASS INDEX: 30.16 KG/M2 | HEIGHT: 62 IN | TEMPERATURE: 98.3 F | SYSTOLIC BLOOD PRESSURE: 124 MMHG

## 2023-04-28 DIAGNOSIS — Z71.84 TRAVEL ADVICE ENCOUNTER: ICD-10-CM

## 2023-04-28 DIAGNOSIS — A04.8 H. PYLORI INFECTION: Primary | ICD-10-CM

## 2023-04-28 DIAGNOSIS — J00 ACUTE RHINITIS: ICD-10-CM

## 2023-04-28 DIAGNOSIS — R10.13 ABDOMINAL PAIN, EPIGASTRIC: ICD-10-CM

## 2023-04-28 PROCEDURE — 90471 IMMUNIZATION ADMIN: CPT | Performed by: FAMILY MEDICINE

## 2023-04-28 PROCEDURE — 90682 RIV4 VACC RECOMBINANT DNA IM: CPT | Performed by: FAMILY MEDICINE

## 2023-04-28 PROCEDURE — 99214 OFFICE O/P EST MOD 30 MIN: CPT | Mod: 25 | Performed by: FAMILY MEDICINE

## 2023-04-28 PROCEDURE — 90472 IMMUNIZATION ADMIN EACH ADD: CPT | Performed by: FAMILY MEDICINE

## 2023-04-28 PROCEDURE — 90691 TYPHOID VACCINE IM: CPT | Performed by: FAMILY MEDICINE

## 2023-04-28 RX ORDER — LOSARTAN POTASSIUM 100 MG/1
TABLET ORAL
COMMUNITY
Start: 2023-04-26

## 2023-04-28 RX ORDER — BISMUTH SUBCITRATE POTASSIUM, METRONIDAZOLE, TETRACYCLINE HYDROCHLORIDE 140; 125; 125 MG/1; MG/1; MG/1
3 CAPSULE ORAL
Status: CANCELLED | OUTPATIENT
Start: 2023-04-28

## 2023-04-28 ASSESSMENT — PAIN SCALES - GENERAL: PAINLEVEL: NO PAIN (0)

## 2023-04-28 NOTE — PROGRESS NOTES
"Gale Nash  /78   Pulse 69   Temp 98.3  F (36.8  C)   Resp 18   Ht 1.562 m (5' 1.5\")   Wt 74.3 kg (163 lb 14.4 oz)   SpO2 98%   BMI 30.47 kg/m       Assessment/Plan:                Gale was seen today for recheck medication, results and uri.    Diagnoses and all orders for this visit:    H. pylori infection    Abdominal pain, epigastric  -     omeprazole (PRILOSEC) 20 MG DR capsule; Take 1 capsule (20 mg) by mouth 2 times daily    Travel advice encounter    Acute rhinitis         DISCUSSION  See discussion below.  Need to work-up treatment regimen more specifically for H. pylori given allergies reported to amoxicillin, clindamycin, metronidazole.  We might be able to come up with a bismuth PPI regiment but I will confer with our [facility] pharmacy team.    I will have her start on the omeprazole component of the treatment at this point in time.  She will continue to refrain from NSAIDs.    Obtain immunizations as discussed.    Provided recommendations to use fluticasone nasal spray and loratadine for symptom relief from her rhinitis.  Subjective:     HPI:    Gale Nash is a 63 year old female she has history of underlying diabetes hypertension, hypothyroidism and dyslipidemia.     used for this visit.    I have seen her a couple of times recently for epigastric pain.  She has had testing performed.  It was apparent that she was taking some NSAIDs which is likely a cause of gastritis but subsequent testing also indicated presence of H. pylori.  Patient does not recall ever being treated for this before but patient has on her allergy list almost every medication and common regimens for H. pylori treatment.  She does not recall all of her reactions to medications but is able to convey she had a serious reaction to amoxicillin and also believes she might of had a more serious reaction to metronidazole.  I discussed that I will confer with our pharmacy team before making " any decisions about a treatment regiment beyond getting her started back on PPI therapy.  She will continue to refrain from NSAIDs.  She is noted to significant improvement in symptoms but still has some tenderness and occasionally has some recurrent acid reflux type symptoms.  I do think her situation warrants treatment.    Patient reports that she contracted an upper respiratory infection.  She refers to it as influenza but no distinct testing is noted.  Patient states things have improved.  She is having a lot of nasal and throat congestion.    She is traveling to [place] [facility].  Patient wished to consider immunizations.  Patient notes that her brother travel to [place] [facility] about a year ago contracted COVID and severe pneumonia and  while he was there.  We discussed the best way to protect her would be to get the COVID bivalent booster which she has not had as of this point.  We also discussed that it would be recommended to get influenza vaccine as well as typhoid vaccine.  Patient reports having routine childhood vaccinations including polio and measles vaccines in the past.  Uncertain about hepatitis a and B vaccination status but will defer for the time being.  Patient is agreeable to typhoid vaccine, influenza vaccine and will return for COVID booster.  No indication for malaria prophylaxis.    ROS:  Complete review of systems is obtained.  Other than the specific considerations noted above complete review of systems is negative.          Objective:   Medications:  Current Outpatient Medications   Medication     losartan (COZAAR) 100 MG tablet     omeprazole (PRILOSEC) 20 MG DR capsule     acetaminophen (TYLENOL) 500 MG tablet     atorvastatin (LIPITOR) 20 MG tablet     blood glucose (CONTOUR NEXT TEST) test strip     celecoxib (CELEBREX) 200 MG capsule     Contour Next EZ (CONTOUR NEXT EZ W/DEVICE KIT) w/Device KIT     famotidine (PEPCID) 40 MG tablet     levothyroxine (SYNTHROID, LEVOTHROID) 25 MCG  tablet     metFORMIN (GLUCOPHAGE-XR) 500 MG 24 hr tablet     TRUEplus Lancets 28G MISC     No current facility-administered medications for this visit.        Allergies:     Allergies   Allergen Reactions     Penicillins Hives and Rash     Tolerates cephalosporins     Clarithromycin Unknown     Per H=P, patient not aware     Flagyl [Metronidazole] Unknown     From H+P, patient not aware     Levofloxacin Unknown     From Pre op H+P, patient not aware     Cephalosporins Rash        Social History     Socioeconomic History     Marital status:      Spouse name: Not on file     Number of children: Not on file     Years of education: Not on file     Highest education level: Not on file   Occupational History     Not on file   Tobacco Use     Smoking status: Never     Smokeless tobacco: Never   Vaping Use     Vaping status: Never Used   Substance and Sexual Activity     Alcohol use: No     Drug use: No     Sexual activity: Not on file   Other Topics Concern     Not on file   Social History Narrative     Not on file     Social Determinants of Health     Financial Resource Strain: Not on file   Food Insecurity: Not on file   Transportation Needs: Not on file   Physical Activity: Not on file   Stress: Not on file   Social Connections: Not on file   Intimate Partner Violence: Not on file   Housing Stability: Not on file       Family History   Problem Relation Age of Onset     Coronary Artery Disease Brother      Diabetes Mother      Hyperlipidemia Father         Most Recent Immunizations   Administered Date(s) Administered     COVID-19 Vaccine 12+ (Pfizer) 03/04/2022     FLU 6-35 months 12/29/2013     Influenza Vaccine >6 months (Alfuria,Fluzone) 12/28/2021     TDAP (Adacel,Boostrix) 08/08/2016        Wt Readings from Last 3 Encounters:   04/28/23 74.3 kg (163 lb 14.4 oz)   03/31/23 76.3 kg (168 lb 4.8 oz)   03/24/23 74.8 kg (165 lb)        BP Readings from Last 6 Encounters:   04/28/23 124/78   03/31/23 135/73  "  03/24/23 134/78        Hemoglobin A1C   Date Value Ref Range Status   05/19/2021 6.2 (A) <6.0 % Final              PHYSICAL EXAM:    /78   Pulse 69   Temp 98.3  F (36.8  C)   Resp 18   Ht 1.562 m (5' 1.5\")   Wt 74.3 kg (163 lb 14.4 oz)   SpO2 98%   BMI 30.47 kg/m           General Appearance:    Alert, cooperative, no distress   Eyes:   No scleral icterus or conjunctival irritation       Lungs:     Clear to auscultation bilaterally, respirations unlabored, no wheezes or crackles   Heart:    Regular rate and rhythm,  No murmur   Abdomen:    Soft, no distention, mild epigastric tenderness on palpation.  No rebound tenderness no guarding.     Extremities:  No edema, no joint swelling or redness, no evidence of any injuries   Skin:  No concerning skin findings, no suspicious moles, no rashes   Neurologic:  On gross examination there is no motor or sensory deficit.  Patient walks with a normal gait                                       "

## 2023-04-28 NOTE — PATIENT INSTRUCTIONS
I recommend the following medications to help with your nasal congestion and mucus.    Fluticasone nasal spray (Flonase)    Loratadine oral allergy tablet 10 mg (Claritin)        Begin taking omeprazole 20 mg once daily.  Discontinue other medication famotidine.    I will confer with our pharmacist here in the clinic about what antibiotic regiment we can use to treat the H. pylori infection in your stomach.  I will let you know once I have more information and I will plan to send those antibiotics to your pharmacy to get started.  I am hopeful upon completion of treatment that your stomach will continue to improve.  We will need to consider follow-up evaluation after your treatment to make sure everything gets better.

## 2023-05-05 ENCOUNTER — TELEPHONE (OUTPATIENT)
Dept: FAMILY MEDICINE | Facility: CLINIC | Age: 63
End: 2023-05-05
Payer: COMMERCIAL

## 2023-05-05 NOTE — TELEPHONE ENCOUNTER
FYI - Status Update    Who is Calling: patient    Update: Patient recently saw Denis Calderon and was told she was getting sent in medications including antibiotics. Patient states she has not received anything and neither has her pharmacy. Please advise.     Does caller want a call/response back: Yes     Okay to leave a detailed message?: Yes at Home number on file [phone] (home) WITH  on the phone.

## 2023-05-08 NOTE — TELEPHONE ENCOUNTER
Lindsay,    My name is Campbell Calderon, I am one of the family medicine physicians at the [facility].      This patient had epigastric pain and a stool test showing H. pylori.  I wanted to initiate treatment.  She states she has never been treated before but I highly suspect that she has based on almost every single treatment for H. pylori being listed in her chart as having caused an allergy/adverse reaction.    I am wondering if you could look at her list of allergies and see if you could help me come up with a treatment plan for H. pylori.      Campbell

## 2023-05-08 NOTE — TELEPHONE ENCOUNTER
Hi Dr. Calderon,     I consulted Payton the previous [facility] pharmacist who now works in GI to help with this one :).     Payton notes to determine first if the Flagyl [metronidazole] interaction was a true allergic reaction or more so an intolerance. If an intolerance would recommend bismth quad therapy.     Payton also offered to call the patient and set up an appointment with her this week it the patient would appreciate that.     Let us know how we can help further.     Thanks,   Farheen Dutta, PharmD  Medication Therapy Management Resident  [phone]

## 2023-05-09 ENCOUNTER — TELEPHONE (OUTPATIENT)
Dept: FAMILY MEDICINE | Facility: CLINIC | Age: 63
End: 2023-05-09
Payer: COMMERCIAL

## 2023-05-09 NOTE — TELEPHONE ENCOUNTER
Thank you for helping us with this patient.  It would be ideal for her to have a visit with a pharmacist.  She is Primarily Lithuanian speaking, an  would be best.  We can use the  services at [phone], option #1.  Maybe we can set up a phone visit with her this week with a pharmacist?    Thank you for your help on this, it is very much appreciated - Keenan/Dr Calderon

## 2023-05-09 NOTE — TELEPHONE ENCOUNTER
Reason for call:  Other   Patient called regarding (reason for call): returning call  Additional comments: Patient was calling with  and asking about medications that she has been waiting for and I relayed the  Message and she didn't seem to understand and hung up. Please advise and call patient back if needed please and thank you.    Phone number to reach patient:  Home number on file [phone] (home)    Best Time:  any    Can we leave a detailed message on this number?  YES

## 2023-05-09 NOTE — TELEPHONE ENCOUNTER
Can we try to get her set up with phone visit with Payton this week please. Thank you.    Thank you for helping us with this patient.  It would be ideal for her to have a visit with a pharmacist.  She is Primarily Khmer speaking, an  would be best.  We can use the  services at [phone], option #1.

## 2023-05-09 NOTE — TELEPHONE ENCOUNTER
Please call patient and let her know I am working with our pharmacy team to try to come up with a medication regiment.  I think the best approach given her allergies to medications which would be used to treat this condition is to have her speak directly with one of our pharmacists.  We are in the process of getting this set up.

## 2023-05-09 NOTE — TELEPHONE ENCOUNTER
If you would be willing to talk with this patient specifically I think it would be helpful.  We tried to sort through  reactions to the medications that are listed as allergies and we did not really make much progress.  It sounds like she does have a history of some more significant reactions to medication, but she was not able to really clarify which medications had more significant reactions and there was not much information in her chart to back up the concerns.

## 2023-05-10 NOTE — TELEPHONE ENCOUNTER
Called Gale via . She reports that she is going out of town and she will be back on 5/25. Right now she is taking some natural organic treatments which does help to calm the pain. She was ok to talk when she is back from her trip -- [place] appt set up.

## 2023-05-26 ENCOUNTER — APPOINTMENT (OUTPATIENT)
Dept: INTERPRETER SERVICES | Facility: CLINIC | Age: 63
End: 2023-05-26
Payer: COMMERCIAL

## 2023-05-31 ENCOUNTER — TELEPHONE (OUTPATIENT)
Dept: FAMILY MEDICINE | Facility: CLINIC | Age: 63
End: 2023-05-31
Payer: COMMERCIAL

## 2023-05-31 ENCOUNTER — VIRTUAL VISIT (OUTPATIENT)
Dept: PHARMACY | Facility: CLINIC | Age: 63
End: 2023-05-31
Payer: COMMERCIAL

## 2023-05-31 DIAGNOSIS — E03.9 ACQUIRED HYPOTHYROIDISM: Chronic | ICD-10-CM

## 2023-05-31 DIAGNOSIS — E11.9 TYPE 2 DIABETES MELLITUS WITHOUT COMPLICATION, WITHOUT LONG-TERM CURRENT USE OF INSULIN (H): Primary | Chronic | ICD-10-CM

## 2023-05-31 DIAGNOSIS — I10 ESSENTIAL HYPERTENSION: ICD-10-CM

## 2023-05-31 DIAGNOSIS — R10.13 ABDOMINAL PAIN, EPIGASTRIC: ICD-10-CM

## 2023-05-31 PROCEDURE — 99606 MTMS BY PHARM EST 15 MIN: CPT | Performed by: PHARMACIST

## 2023-05-31 PROCEDURE — 99607 MTMS BY PHARM ADDL 15 MIN: CPT | Performed by: PHARMACIST

## 2023-05-31 RX ORDER — TETRACYCLINE HYDROCHLORIDE 500 MG/1
500 CAPSULE ORAL 4 TIMES DAILY
Qty: 56 CAPSULE | Refills: 0 | Status: SHIPPED | OUTPATIENT
Start: 2023-05-31 | End: 2023-06-14

## 2023-05-31 RX ORDER — METRONIDAZOLE 250 MG/1
250 TABLET ORAL 4 TIMES DAILY
Qty: 56 TABLET | Refills: 0 | Status: SHIPPED | OUTPATIENT
Start: 2023-05-31 | End: 2023-06-14

## 2023-05-31 RX ORDER — ONDANSETRON 4 MG/1
4 TABLET, FILM COATED ORAL EVERY 6 HOURS PRN
Qty: 30 TABLET | Refills: 0 | Status: SHIPPED | OUTPATIENT
Start: 2023-05-31

## 2023-05-31 NOTE — TELEPHONE ENCOUNTER
Central Prior Authorization Team - Phone: [phone]     PA Initiation    Medication: OMEPRAZOLE 20 MG PO CPDR  Insurance Company: [place] - Phone [phone] Fax [phone]  Pharmacy Filling the Rx: [id number] [place] [address] AT [place] [address]  Filling Pharmacy Phone: [phone]  Filling Pharmacy Fax:    Start Date: 5/31/2023

## 2023-05-31 NOTE — PROGRESS NOTES
Medication Therapy Management (MTM) Encounter    ASSESSMENT:                            H. Pylori infection: Today we discussed H. pylori in detail including modes of transmission, prevalence, treatments, and eradication testing.  We also discussed potential for antibiotic resistance and importance of finishing treatment if able.  Regarding her history of antibiotics, she only was able to confirm the penicillin allergy.  However since metronidazole and clarithromycin are on her allergy list, although she does not remember this, it appears that she has had exposure to clarithromycin in the past therefore I recommended against a clarithromycin based treatment.  Therefore, we will start her with bismuth quad and hopefully she can tolerate the treatment, but to be safe I will send her a prescription for ondansetron.  We discussed omeprazole (20 mg twice daily), tetracycline, and metronidazole today.  She already has Pepto-Bismol at home but we clarified the dosing should be 30 ml (524 mg).  I recommended that they take the treatment after meals to lessen GI side effects.  We will follow-up after 1 week on on the phone to ensure they are tolerating the treatment and I will call her pharmacy to make sure there are no issues with getting the PPI.  We reviewed retesting at least 4 weeks after treatment is complete (at least 2 weeks off PPI and at least 4 weeks of antibiotics).     Type 2 Diabetes: We will discuss this further at follow-up.  Last A1c at goal.  Normal renal function.  We will discuss aspirin at follow-up.    Hypothyroidism: Last TSH at goal.  We will discuss this further at follow-up, but I recommend that she continue to take this even with the H. pylori treatment.    Hypertension: We will discuss her blood pressure in detail, but she did confirm that she is taking a blood pressure pill.  Last blood pressure well controlled.      PLAN:                            1.  Start H. pylori treatment with bismuth quad  for 14 days consisting of the following;   -- Omeprazole 20 mg twice daily  -- Pepto-Bismol liquid (bismuth subsalicylate) 30 ml (524 mg) 4 times daily (patient has at home)  -- Tetracycline 500 mg 4 times daily  -- Metronidazole 250 mg 4 times daily    2.  Rx sent for ondansetron 4 mg as needed for nausea    Follow-up: 1 week phone follow-up    SUBJECTIVE/OBJECTIVE:                          Gale Nash is a 63 year old female called for an initial visit. She was referred to me from Dr. Calderon. Patient seen with .     Reason for visit: H pylori treatment  Medication Adherence/Access: no issues reported. Due to time however, we did not review her other medications in detail.     H Pylori infection: Currently taking no medication.   Reports that she is not taking famotidine 40 mg twice daily or omeprazole 20 mg twice daily. Reports that she was not able to get from her pharmacy.   traveled to [facility]. Back on 5/25/23 hence the delay in us meeting.   Patient was in urgency room on 3/4/23 with abdominal pain -- underwent CT abdomen and given antiemetics and Toradol. She was previously already on omeprazole 20 mg. She then saw Dr. Calderon on 3/24/23 and omeprazole was increased to twice daily. Underwent US abdomen on 3/29/23. Had follow up with Dr. Calderon on 3/31 and started on famotidine 40 mg twice daily and sucralfate PRN. Sucralfate liquid was not covered and tabs sent instead on 4/3/23.   Today she reports that she has not been having much pain and has been feeling pretty good.  On Monday she ate a burger and had some vomiting.  Today she did have a little bit of stomach pain.  H Pylori stool antigen positive on 4/10/23.   Regarding her medication allergies, she confirms that she is allergic to penicillins, but when I review the other medications with her she was not familiar with them. Reports that she was on a medication in the past and she felt like she was going to die, but she is not  sure what that was.  Reports that this medication was stopped and a doctor was going to give her an alternative, but that did not occur.  Denies anyone else she lives with similar GI symptoms.    Reports that she already has liquid pepto bismol at home and reports that she is scared about it giving her constipation.   Recent antibiotics? None   etoh use? none  NSAIDs? Only APAP     Type 2 Diabetes: Currently taking metformin  mg.   Last A1c checked 1/24/23 = 6.5%.   Microalbumin checked 1/24/23 at    Last GFR >90 ml/min on 3/24/23  Is taking atorvastatin 20 mg daily  Last lipids checked at  on 1/24/23  We did not discuss this today.    Hypothyroidism: Currently taking levothyroxine 25 mcg. Administration: Morning.  We did not discuss signs or symptoms today.  Date Value Ref Range Status   03/24/2023 3.41 0.30 - 4.20 uIU/mL Final     Hypertension: Currently taking losartan 100 mg daily.  We did not discuss this in detail today.  BP Readings from Last 3 Encounters:   04/28/23 124/78   03/31/23 135/73   03/24/23 134/78     Pulse Readings from Last 3 Encounters:   04/28/23 69   03/31/23 80   03/24/23 63       Today's Vitals: There were no vitals taken for this visit.     Allergies/ADRs: Reviewed in chart  Past Medical History: Reviewed in chart  Tobacco: She reports that she has never smoked. She has never used smokeless tobacco.  Alcohol: not currently using    ----------------    I spent 60 minutes with this patient today. All changes were made via verbal approval with Dr. Denis Calderon. A copy of the visit note was provided to the patient's provider(s).    A summary of these recommendations was declined by the patient.    Payton Alba (Khazaeli), Pharm.D., [facility]   Medication Therapy Management Pharmacist     Telemedicine Visit Details  Type of service:  Telephone visit  Start Time: 10 AM  End Time: 11 AM     Medication Therapy Recommendations  Abdominal pain, epigastric    Current Medication: omeprazole  (PRILOSEC) 20 MG DR capsule   Rationale: Untreated condition - Needs additional medication therapy - Indication   Recommendation: Start Medication   Status: Accepted per Provider

## 2023-05-31 NOTE — TELEPHONE ENCOUNTER
Central Prior Authorization Team - Phone: [phone]     PA Initiation    Medication: TETRACYCLINE  MG PO CAPS  Insurance Company: [employer] - Phone [phone] Fax [phone]  Pharmacy Filling the Rx: [facility] #[id number] [place] [address] AT [place] [address]  Filling Pharmacy Phone: [phone]  Filling Pharmacy Fax:    Start Date: 5/31/2023

## 2023-05-31 NOTE — TELEPHONE ENCOUNTER
PA Initiation     Medication:  omeprazole (PRILOSEC) 20 MG DR capsule  Insurance Company:  [employer] [facility]  Pharmacy Filling the Rx:  [facility]  Filling Pharmacy Phone:  [phone]  Filling Pharmacy Fax:  [phone]  Start Date:  5/31/2023

## 2023-05-31 NOTE — TELEPHONE ENCOUNTER
PA Initiation    Medication:  tetracycline (ACHROMYCIN/SUMYCIN) 500 MG capsule  Insurance Company:  [employer] [facility]  Pharmacy Filling the Rx:  [facility]  Filling Pharmacy Phone:  [phone]  Filling Pharmacy Fax:  [phone]  Start Date:  5/31/2023

## 2023-06-01 NOTE — TELEPHONE ENCOUNTER
Central Prior Authorization Team - Phone: [phone]     Prior Authorization Approval    Medication: TETRACYCLINE  MG PO CAPS  Authorization Effective Date: 3/3/2023  Authorization Expiration Date: 6/1/2024  Approved Dose/Quantity: 56  Reference #:     Insurance Company: [employer] - Phone [phone] Fax [phone]  Expected CoPay:       CoPay Card Available:      Financial Assistance Needed:   Which Pharmacy is filling the prescription: [facility] #[id number] [place], [place] - [facility] [address] AT [place] [address]  Pharmacy Notified: Yes  Patient Notified: Yes

## 2023-06-02 NOTE — TELEPHONE ENCOUNTER
Central Prior Authorization Team - Phone: [phone]     Prior Authorization Approval    Medication: OMEPRAZOLE 20 MG PO CPDR  Authorization Effective Date: 3/3/2023  Authorization Expiration Date: 7/1/2023  Approved Dose/Quantity: 28  Reference #:     Insurance Company: [employer] - Phone [phone] Fax [phone]  Expected CoPay:       CoPay Card Available:      Financial Assistance Needed:   Which Pharmacy is filling the prescription: [facility] #[id number] [place], [place] - [address] [place] [address] [address] AT [place] [address]  Pharmacy Notified: Yes  Patient Notified: Yes pharmacy will notify patient when ready

## 2023-06-07 NOTE — PROGRESS NOTES
Medication Therapy Management (MTM) Encounter    ASSESSMENT:                            H. Pylori infection: Patient has not started the H. Pylori treatment yet -- I did call her pharmacy and the orders are ready and I let her know. I will follow up with her next week to check in on her tolerance of the medications. I reviewed with her again that the fourth ingredient in the treatment is pepto that she has at home.     Type 2 Diabetes: BG sound well controlled.  Last A1c at goal.  Normal renal function.  We will discuss aspirin at follow-up.    Hypothyroidism: Last TSH at goal.   I recommend that she continue to take this even with the H. pylori treatment.    Hypertension: Last BP well controlled and at goal <130/80 mmHg.       PLAN:                            1.  Start H. pylori treatment with bismuth quad for 14 days consisting of the following;   -- Omeprazole 20 mg twice daily  -- Pepto-Bismol liquid (bismuth subsalicylate) 30 ml (524 mg) 4 times daily (patient has at home)  -- Tetracycline 500 mg 4 times daily  -- Metronidazole 250 mg 4 times daily  **confirmed ready at pharmacy    Follow-up: 1 week phone follow-up    SUBJECTIVE/OBJECTIVE:                          Gale Nash is a 63 year old female called for a follow up visit. She was referred to me from Dr. Calderon. Patient seen with .     Reason for visit: H pylori treatment  Medication Adherence/Access: no issues reported. Due to time however, we did not review her other medications in detail.     H Pylori infection: At last MTM appt we started her on bismuth quad with omeprazole 20 mg twice daily, bismuth liquid (had at home), tetracycline 500 mg four times daily, and metronidazole 250 mg four times daily. She reports that she has not picked up yet.   traveled to [facility]. Back on 5/25/23 hence the delay in us meeting.   Patient was in urgency room on 3/4/23 with abdominal pain -- underwent CT abdomen and given antiemetics and  Toradol. She was previously already on omeprazole 20 mg. She then saw Dr. Calderon on 3/24/23 and omeprazole was increased to twice daily. Underwent US abdomen on 3/29/23. Had follow up with Dr. Calderon on 3/31 and started on famotidine 40 mg twice daily and sucralfate PRN. Sucralfate liquid was not covered and tabs sent instead on 4/3/23.   Today she reports that she has been feeling well and no longer having stomach pain.  H Pylori stool antigen positive on 4/10/23.   Regarding her medication allergies, she confirms that she is allergic to penicillins, but when I review the other medications with her she was not familiar with them. Reports that she was on a medication in the past and she felt like she was going to die, but she is not sure what that was.  Reports that this medication was stopped and a doctor was going to give her an alternative, but that did not occur.  Denies anyone else she lives with similar GI symptoms.    Recent antibiotics? None   etoh use? none  NSAIDs? Only APAP     Type 2 Diabetes: Currently taking metformin  mg.   Reports BG have been 125-130s  Last A1c checked 1/24/23 = 6.5%.   Microalbumin checked 1/24/23 at    Last GFR >90 ml/min on 3/24/23  Is taking atorvastatin 20 mg daily  Last lipids checked at  on 1/24/23    Hypothyroidism: Currently taking levothyroxine 25 mcg. Administration: Morning.    Date Value Ref Range Status   03/24/2023 3.41 0.30 - 4.20 uIU/mL Final     Hypertension: Currently taking losartan 100 mg daily.   BP Readings from Last 3 Encounters:   04/28/23 124/78   03/31/23 135/73   03/24/23 134/78     Pulse Readings from Last 3 Encounters:   04/28/23 69   03/31/23 80   03/24/23 63       Today's Vitals: There were no vitals taken for this visit.     Allergies/ADRs: Reviewed in chart  Past Medical History: Reviewed in chart  Tobacco: She reports that she has never smoked. She has never used smokeless tobacco.  Alcohol: not currently using    ----------------    I  spent 15 minutes with this patient today. All changes were made via verbal approval with Dr. Denis Calderon. A copy of the visit note was provided to the patient's provider(s).    A summary of these recommendations was declined by the patient.    Payton Alba (Khazaeli), Pharm.D., [facility]   Medication Therapy Management Pharmacist     Telemedicine Visit Details  Type of service:  Telephone visit  Start Time: 11:30 AM  End Time: 11:15 AM     Medication Therapy Recommendations  No medication therapy recommendations to display

## 2023-06-08 ENCOUNTER — VIRTUAL VISIT (OUTPATIENT)
Dept: PHARMACY | Facility: CLINIC | Age: 63
End: 2023-06-08
Payer: COMMERCIAL

## 2023-06-08 DIAGNOSIS — I10 ESSENTIAL HYPERTENSION: ICD-10-CM

## 2023-06-08 DIAGNOSIS — R10.13 ABDOMINAL PAIN, EPIGASTRIC: Primary | ICD-10-CM

## 2023-06-08 DIAGNOSIS — E11.9 TYPE 2 DIABETES MELLITUS WITHOUT COMPLICATION, WITHOUT LONG-TERM CURRENT USE OF INSULIN (H): ICD-10-CM

## 2023-06-08 DIAGNOSIS — E03.9 ACQUIRED HYPOTHYROIDISM: ICD-10-CM

## 2023-06-08 PROCEDURE — 99606 MTMS BY PHARM EST 15 MIN: CPT | Performed by: PHARMACIST

## 2023-06-22 ENCOUNTER — NURSE TRIAGE (OUTPATIENT)
Dept: NURSING | Facility: CLINIC | Age: 63
End: 2023-06-22
Payer: COMMERCIAL

## 2023-06-22 ENCOUNTER — APPOINTMENT (OUTPATIENT)
Dept: INTERPRETER SERVICES | Facility: CLINIC | Age: 63
End: 2023-06-22
Payer: COMMERCIAL

## 2023-06-22 ENCOUNTER — VIRTUAL VISIT (OUTPATIENT)
Dept: PHARMACY | Facility: CLINIC | Age: 63
End: 2023-06-22
Payer: COMMERCIAL

## 2023-06-22 DIAGNOSIS — I10 ESSENTIAL HYPERTENSION: ICD-10-CM

## 2023-06-22 DIAGNOSIS — R10.13 ABDOMINAL PAIN, EPIGASTRIC: Primary | ICD-10-CM

## 2023-06-22 PROCEDURE — 99606 MTMS BY PHARM EST 15 MIN: CPT | Performed by: PHARMACIST

## 2023-06-22 NOTE — TELEPHONE ENCOUNTER
Provider Recommendation Follow Up:   Reached patient/caregiver. Informed of provider's recommendations. Patient verbalized understanding and agrees with the plan.           ISA Fenton, RN  [facility]

## 2023-06-22 NOTE — TELEPHONE ENCOUNTER
"Call conducted with a .      Nurse Triage SBAR    Is this a 2nd Level Triage? NO    Situation: Headache, elevated blood pressure    Background: Patient calling, states that she is taking metronidazole and tetracycline for her stomach.  She calls today because she has been having headaches and elevated blood pressure.  States her BP was 159/89.  She also states that he has numbness in her arms and legs and states that she has been dizzy.  She is wondering if it is related to the antibiotics.     Assessment: Headache, elevated blood pressure    Protocol Recommended Disposition:   See in office today    Recommendation:     Patient would like to be seen today. Please contact her with any further recommendations.      Routed to provider     JERZY PAPPAS RN      Does the patient meet one of the following criteria for ADS visit consideration? 16+ years old, with an [facility] PCP     TIP  Providers, please consider if this condition is appropriate for management at one of our Acute and Diagnostic Services sites.     If patient is a good candidate, please use dotphrase <dot>triageresponse and select Refer to ADS to document.                        Taking 2 antibitoics, and 1 other medicine, 4 days after taking medicine started having headaches and dizziness, yesterday felt \"drunk\" and body aches, metronidazole, tetracycline, and omeprazole prescribed for stomach bacteria, BS was 120, headache, took tylenol and it did not help.  BP was 152/89.  Chest pressure, BP this morning was 159/ lips are numb, losartan, dizziness, patient states numbness in her left hand at the time of call,     Reason for Disposition    Patient wants to be seen    Additional Information    Negative: Numbness of the face, arm or leg on one side of the body and new-onset    Negative: Difficult to awaken or acting confused (e.g., disoriented, slurred speech)    Negative: Weakness of the face, arm or leg on one side of the body and " new-onset    Negative: Loss of speech or garbled speech and new-onset    Negative: Passed out (i.e., fainted, collapsed and was not responding)    Negative: Sounds like a life-threatening emergency to the triager    Negative: Followed a head injury within last 3 days    Negative: Traumatic Brain Injury (TBI) is suspected    Negative: Sinus pain of forehead and yellow or green nasal discharge    Negative: Pregnant    Negative: Unable to walk without falling    Negative: Stiff neck (can't touch chin to chest)    Negative: Possibility of carbon monoxide exposure    Negative: SEVERE headache, states 'worst headache' of life    Negative: SEVERE headache, sudden-onset (i.e., reaching maximum intensity within seconds to 1 hour)    Negative: Severe pain in one eye    Negative: Loss of vision or double vision  (Exception: Same as prior migraines.)    Negative: Patient sounds very sick or weak to the triager    Negative: Fever > 103 F (39.4 C)    Negative: Fever > 100.0 F (37.8 C) and has diabetes mellitus or a weak immune system (e.g., HIV positive, cancer chemotherapy, organ transplant, splenectomy, chronic steroids)    Negative: SEVERE headache (e.g., excruciating) and has had severe headaches before    Negative: SEVERE headache and not relieved by pain meds    Negative: SEVERE headache and vomiting    Negative: SEVERE headache and fever    Negative: New headache and weak immune system (e.g., HIV positive, cancer chemo, splenectomy, organ transplant, chronic steroids)    Negative: Fever present > 3 days (72 hours)    Protocols used: HEADACHE-A-OH

## 2023-06-22 NOTE — PROGRESS NOTES
Medication Therapy Management (MTM) Encounter    ASSESSMENT:                            H. Pylori infection: Patient is in the ED, so we were not able to talk too long. Since she has taken the medications for 10 days, it would be reasonable to discontinue. Unsure if her symptoms are due to the antibiotics, but will see what further evaluation is done today. I will keep an eye out on her ED note and follow up when she leaves the hospital.      Hypertension: Last BP well controlled and at goal <130/80 mmHg. Will be monitored in the ED today.       PLAN:                            Patient in ED -- will follow up after she leaves the hospital.     Follow-up: phone follow-up after leaving hospital.     SUBJECTIVE/OBJECTIVE:                          Gale Nash is a 63 year old female called for a follow up visit. She was referred to me from Dr. Calderon. Patient seen with . #[id number]    Reason for visit: H pylori treatment  Medication Adherence/Access: no issues reported. Due to time however, we did not review her other medications in detail.     H Pylori infection: At last MTM appt we started her on bismuth quad with omeprazole 20 mg twice daily, bismuth liquid (had at home), tetracycline 500 mg four times daily, and metronidazole 250 mg four times daily. She reports that she has been taking for 10 days and is experiencing a sharp headache and elevated BP (see nurse triage message). She is currently in the ED.   traveled to [facility]. Back on 5/25/23 hence the delay in us meeting.   Patient was in urgency room on 3/4/23 with abdominal pain -- underwent CT abdomen and given antiemetics and Toradol. She was previously already on omeprazole 20 mg. She then saw Dr. Calderon on 3/24/23 and omeprazole was increased to twice daily. Underwent US abdomen on 3/29/23. Had follow up with Dr. Calderon on 3/31 and started on famotidine 40 mg twice daily and sucralfate PRN.   H Pylori stool antigen positive on  4/10/23.   Regarding her medication allergies, she confirms that she is allergic to penicillins, but when I review the other medications with her she was not familiar with them. Reports that she was on a medication in the past and she felt like she was going to die, but she is not sure what that was.  Reports that this medication was stopped and a doctor was going to give her an alternative, but that did not occur.  Denies anyone else she lives with similar GI symptoms.      Hypertension: Currently taking losartan 100 mg daily.   BP Readings from Last 3 Encounters:   04/28/23 124/78   03/31/23 135/73   03/24/23 134/78     Pulse Readings from Last 3 Encounters:   04/28/23 69   03/31/23 80   03/24/23 63       Today's Vitals: There were no vitals taken for this visit.     Allergies/ADRs: Reviewed in chart  Past Medical History: Reviewed in chart  Tobacco: She reports that she has never smoked. She has never used smokeless tobacco.  Alcohol: not currently using    ----------------    I spent 10 minutes with this patient today. All changes were made via verbal approval with Dr. Denis Calderon. A copy of the visit note was provided to the patient's provider(s).    A summary of these recommendations was declined by the patient.    Payton Alba (Khazaeli), Pharm.D., [facility]   Medication Therapy Management Pharmacist     Telemedicine Visit Details  Type of service:  Telephone visit  Start Time: 3:18 PM  End Time: 3:28 PM     Medication Therapy Recommendations  No medication therapy recommendations to display

## 2023-07-05 ENCOUNTER — TELEPHONE (OUTPATIENT)
Dept: PHARMACY | Facility: CLINIC | Age: 63
End: 2023-07-05
Payer: COMMERCIAL

## 2023-07-05 NOTE — TELEPHONE ENCOUNTER
I called Gale using  ID#[id number]    She reports that she was in the ED at [facility] and labs were drawn, but no new medications. Reports that she is feeling better and she does not think that the H pylori medications were the cause of her symptoms. She was taking the medications then she stopped them, but reports that she is now on them again. She thinks she has about a week left.     Not sure why I dont see her ED visit? Will call her next week to see if she has completed the H Pylori treatment and will set up stool testing 4 weeks after.     Payton Alba, Pharm.D., BCACP   Medication Therapy Management Pharmacist   [facility]

## 2023-08-22 ENCOUNTER — OFFICE VISIT (OUTPATIENT)
Dept: FAMILY MEDICINE | Facility: CLINIC | Age: 63
End: 2023-08-22
Payer: COMMERCIAL

## 2023-08-22 VITALS
OXYGEN SATURATION: 98 % | BODY MASS INDEX: 30.36 KG/M2 | DIASTOLIC BLOOD PRESSURE: 76 MMHG | SYSTOLIC BLOOD PRESSURE: 128 MMHG | TEMPERATURE: 98.1 F | RESPIRATION RATE: 18 BRPM | WEIGHT: 165 LBS | HEART RATE: 67 BPM | HEIGHT: 62 IN

## 2023-08-22 DIAGNOSIS — K29.70 GASTRITIS WITHOUT BLEEDING, UNSPECIFIED CHRONICITY, UNSPECIFIED GASTRITIS TYPE: ICD-10-CM

## 2023-08-22 DIAGNOSIS — E11.9 TYPE 2 DIABETES MELLITUS WITHOUT COMPLICATION, WITHOUT LONG-TERM CURRENT USE OF INSULIN (H): Primary | ICD-10-CM

## 2023-08-22 DIAGNOSIS — M79.622 LEFT AXILLARY PAIN: ICD-10-CM

## 2023-08-22 DIAGNOSIS — G62.9 NEUROPATHY: ICD-10-CM

## 2023-08-22 DIAGNOSIS — E03.9 ACQUIRED HYPOTHYROIDISM: Chronic | ICD-10-CM

## 2023-08-22 DIAGNOSIS — I10 ESSENTIAL HYPERTENSION: ICD-10-CM

## 2023-08-22 LAB
ALBUMIN SERPL BCG-MCNC: 4.2 G/DL (ref 3.5–5.2)
ALP SERPL-CCNC: 88 U/L (ref 35–104)
ALT SERPL W P-5'-P-CCNC: 20 U/L (ref 0–50)
ANION GAP SERPL CALCULATED.3IONS-SCNC: 12 MMOL/L (ref 7–15)
AST SERPL W P-5'-P-CCNC: 28 U/L (ref 0–45)
BASOPHILS # BLD AUTO: 0 10E3/UL (ref 0–0.2)
BASOPHILS NFR BLD AUTO: 0 %
BILIRUB SERPL-MCNC: 0.5 MG/DL
BUN SERPL-MCNC: 11.9 MG/DL (ref 8–23)
CALCIUM SERPL-MCNC: 9.5 MG/DL (ref 8.8–10.2)
CHLORIDE SERPL-SCNC: 103 MMOL/L (ref 98–107)
CREAT SERPL-MCNC: 0.59 MG/DL (ref 0.51–0.95)
DEPRECATED HCO3 PLAS-SCNC: 22 MMOL/L (ref 22–29)
EOSINOPHIL # BLD AUTO: 0.1 10E3/UL (ref 0–0.7)
EOSINOPHIL NFR BLD AUTO: 1 %
ERYTHROCYTE [DISTWIDTH] IN BLOOD BY AUTOMATED COUNT: 13.8 % (ref 10–15)
GFR SERPL CREATININE-BSD FRML MDRD: >90 ML/MIN/1.73M2
GLUCOSE SERPL-MCNC: 101 MG/DL (ref 70–99)
HBA1C MFR BLD: 6.2 % (ref 0–5.6)
HCT VFR BLD AUTO: 36 % (ref 35–47)
HGB BLD-MCNC: 11.9 G/DL (ref 11.7–15.7)
IMM GRANULOCYTES # BLD: 0 10E3/UL
IMM GRANULOCYTES NFR BLD: 0 %
LYMPHOCYTES # BLD AUTO: 4 10E3/UL (ref 0.8–5.3)
LYMPHOCYTES NFR BLD AUTO: 34 %
MCH RBC QN AUTO: 28.4 PG (ref 26.5–33)
MCHC RBC AUTO-ENTMCNC: 33.1 G/DL (ref 31.5–36.5)
MCV RBC AUTO: 86 FL (ref 78–100)
MONOCYTES # BLD AUTO: 0.9 10E3/UL (ref 0–1.3)
MONOCYTES NFR BLD AUTO: 7 %
NEUTROPHILS # BLD AUTO: 6.7 10E3/UL (ref 1.6–8.3)
NEUTROPHILS NFR BLD AUTO: 58 %
PLATELET # BLD AUTO: 324 10E3/UL (ref 150–450)
POTASSIUM SERPL-SCNC: 3.9 MMOL/L (ref 3.4–5.3)
PROT SERPL-MCNC: 7.3 G/DL (ref 6.4–8.3)
RBC # BLD AUTO: 4.19 10E6/UL (ref 3.8–5.2)
SODIUM SERPL-SCNC: 137 MMOL/L (ref 136–145)
TSH SERPL DL<=0.005 MIU/L-ACNC: 1.23 UIU/ML (ref 0.3–4.2)
VIT B12 SERPL-MCNC: 643 PG/ML (ref 232–1245)
WBC # BLD AUTO: 11.7 10E3/UL (ref 4–11)

## 2023-08-22 PROCEDURE — 83036 HEMOGLOBIN GLYCOSYLATED A1C: CPT | Performed by: FAMILY MEDICINE

## 2023-08-22 PROCEDURE — 80053 COMPREHEN METABOLIC PANEL: CPT | Performed by: FAMILY MEDICINE

## 2023-08-22 PROCEDURE — 36415 COLL VENOUS BLD VENIPUNCTURE: CPT | Performed by: FAMILY MEDICINE

## 2023-08-22 PROCEDURE — 84443 ASSAY THYROID STIM HORMONE: CPT | Performed by: FAMILY MEDICINE

## 2023-08-22 PROCEDURE — 85025 COMPLETE CBC W/AUTO DIFF WBC: CPT | Performed by: FAMILY MEDICINE

## 2023-08-22 PROCEDURE — 99214 OFFICE O/P EST MOD 30 MIN: CPT | Performed by: FAMILY MEDICINE

## 2023-08-22 PROCEDURE — 82607 VITAMIN B-12: CPT | Performed by: FAMILY MEDICINE

## 2023-08-22 RX ORDER — GABAPENTIN 300 MG/1
300 CAPSULE ORAL AT BEDTIME
Qty: 30 CAPSULE | Refills: 0 | Status: SHIPPED | OUTPATIENT
Start: 2023-08-22

## 2023-08-22 ASSESSMENT — PAIN SCALES - GENERAL: PAINLEVEL: MILD PAIN (3)

## 2023-08-22 NOTE — PROGRESS NOTES
"Gale Nash  /76   Pulse 67   Temp 98.1  F (36.7  C)   Resp 18   Ht 1.562 m (5' 1.5\")   Wt 74.8 kg (165 lb)   SpO2 98%   BMI 30.67 kg/m       Assessment/Plan:                Gale was seen today for pain, recheck medication and arm pain.    Diagnoses and all orders for this visit:    Type 2 diabetes mellitus without complication, without long-term current use of insulin (H)  -     Comprehensive metabolic panel  -     Hemoglobin A1c    Left axillary pain  -     CBC with Platelets & Differential  -     MA Diagnostic Digital Left; Future    Neuropathy  -     Vitamin B12  -     EMG; Future  -     gabapentin (NEURONTIN) 300 MG capsule; Take 1 capsule (300 mg) by mouth At Bedtime    Acquired hypothyroidism  -     TSH with free T4 reflex    Essential hypertension  -     Comprehensive metabolic panel    Gastritis without bleeding, unspecified chronicity, unspecified gastritis type  -     omeprazole (PRILOSEC) 20 MG DR capsule; Take 1 capsule (20 mg) by mouth daily         DISCUSSION  neuropathy: patient has neuropathy in the lower extremities. No significant findings on exam other than slight decrease sensation in the big toe on both feet and slight more decreased sensation using the monofilament line on the right compared to the left. No other significant weakness or dysfunction noted. Potential causes for neuropathy are numerous stemming from metabolic causes to potential for other more structural considerations (although this seems less likely based on symptom picture). Will obtain laboratory testing and send her for an EMG.    Left axillary pain: I don't palpate a discrete lump I do palpate significant tenderness of the musculature in the area. I am concerned about the amount of discomfort that is present in the duration. We will have her have a diagnostic mammogram to evaluate. Wait a long discussion that she should not proceed with a routine mammogram but is scheduled for tomorrow in another " healthcare system but should instead schedule a diagnostic mammogram given this concern.    I suspect that her abdominal discomfort is return of more mild gastritis. I do not think it is imperative to retest for H. pylori at this point in time but would recommend that she take a PPI on a more ongoing basis and then will reassess and decide if further action is necessary.    Real assess diabetes and thyroid concerns.  Subjective:     HPI:    Gale Nash is a 63 year old female is here today to discuss pain in her legs, patient also brings up concerns that she has significant pain in the left axillary region and has worsening abdominal pain.      I have seen her in a couple of occasions will be diagnosed and initiated treatment for H. pylori.    Her medical history is also noted to include hypothyroidism, type II diabetes, hypertension and dyslipidemia.    A  is used on the telephone for the duration of our visit as she is [place]-speaking.    She reports for three weeks she has had significant burning pain in the lower legs and feet. She describes this as a very hot sensation. It is worse at night but is starting to become more bothersome during the day. Patient notes no specific instance not seem to start this and she is not dealt with this before. She does have potential reasons for neuropathy including diabetes and thyroid disorder. Patient is not currently taking thyroid medication. She denies any weakness or incoordination. She has mild low back discomfort but does not have a history of any significant spine problems. She is not noted any redness of the skin color changes or swelling in the feet.    She reports she has significant pain in the left axillary region. She feels that there is a lump in the region and it can affect the movement of her arm. This has been going on for about a month. She had a routine mammogram scheduled for tomorrow at [facility]. I don't have record  of any other recent mammography. Patient denies any specific concern with the breast area itself.    She recently completed treatment for H. pylori. She is significant epigastric and upper abdominal discomfort leading to the diagnosis. She was placed on medication therapy. She reports she had basically complete resolution of symptoms but over the past couple of weeks she has had a gradual return of more abdominal discomfort. No obvious signs or symptoms of gastrointestinal bleeding.    ROS:  Complete review of systems is obtained.  Other than the specific considerations noted above complete review of systems is negative.          Objective:   Medications:  Current Outpatient Medications   Medication    acetaminophen (TYLENOL) 500 MG tablet    atorvastatin (LIPITOR) 20 MG tablet    blood glucose (CONTOUR NEXT TEST) test strip    Contour Next EZ (CONTOUR NEXT EZ W/DEVICE KIT) w/Device KIT    gabapentin (NEURONTIN) 300 MG capsule    levothyroxine (SYNTHROID, LEVOTHROID) 25 MCG tablet    losartan (COZAAR) 100 MG tablet    metFORMIN (GLUCOPHAGE-XR) 500 MG 24 hr tablet    omeprazole (PRILOSEC) 20 MG DR capsule    ondansetron (ZOFRAN) 4 MG tablet    TRUEplus Lancets 28G MISC     No current facility-administered medications for this visit.        Allergies:     Allergies   Allergen Reactions    Penicillins Hives and Rash     Tolerates cephalosporins    Cephalosporins Rash        Social History     Socioeconomic History    Marital status:      Spouse name: Not on file    Number of children: Not on file    Years of education: Not on file    Highest education level: Not on file   Occupational History    Not on file   Tobacco Use    Smoking status: Never    Smokeless tobacco: Never   Vaping Use    Vaping Use: Never used   Substance and Sexual Activity    Alcohol use: No    Drug use: No    Sexual activity: Not on file   Other Topics Concern    Not on file   Social History Narrative    Not on file     Social Determinants  "of Health     Financial Resource Strain: Not on file   Food Insecurity: Not on file   Transportation Needs: Not on file   Physical Activity: Not on file   Stress: Not on file   Social Connections: Not on file   Intimate Partner Violence: Not on file   Housing Stability: Not on file       Family History   Problem Relation Age of Onset    Coronary Artery Disease Brother     Diabetes Mother     Hyperlipidemia Father         Most Recent Immunizations   Administered Date(s) Administered    COVID-19 MONOVALENT 12+ (Pfizer) 03/04/2022    FLU 6-35 months 12/29/2013    Influenza Vaccine 50-64 or 18-64 w/egg allergy (Flublok) 04/28/2023    Influenza Vaccine >6 months (Alfuria,Fluzone) 12/28/2021    TDAP (Adacel,Boostrix) 08/08/2016    Typhoid IM 04/28/2023        Wt Readings from Last 3 Encounters:   08/22/23 74.8 kg (165 lb)   04/28/23 74.3 kg (163 lb 14.4 oz)   03/31/23 76.3 kg (168 lb 4.8 oz)        BP Readings from Last 6 Encounters:   08/22/23 128/76   04/28/23 124/78   03/31/23 135/73   03/24/23 134/78        Hemoglobin A1C   Date Value Ref Range Status   05/19/2021 6.2 (A) <6.0 % Final          PHYSICAL EXAM:    /76   Pulse 67   Temp 98.1  F (36.7  C)   Resp 18   Ht 1.562 m (5' 1.5\")   Wt 74.8 kg (165 lb)   SpO2 98%   BMI 30.67 kg/m           General Appearance:    Alert, cooperative, no distress   Eyes:   No scleral icterus or conjunctival irritation       Throat:   Lips, mucosa, and tongue normal; teeth and gums normal   Neck:   Supple, symmetrical, trachea midline, no adenopathy;        thyroid:  No enlargement/tenderness/nodules   Lungs:     Clear to auscultation bilaterally, respirations unlabored, no wheezesor crackles   Heart:    Regular rate and rhythm,  No murmur   Abdomen:    Soft, no distention, patient reports significant diffuse tenderness but no rebound tenderness or guarding.     Extremities:  No edema, no jointswelling or redness, no evidence of any injuries, palpable dorsalis pedis pulses. "  No ulcerations.  No significant callus formation.  No other foot deformities.   Skin:  Noconcerning skin findings, no suspicious moles, no rashes   Neurologic:  On gross examination there is no motor or sensory deficit.  Specific examination of the feet using the monofilament line revealsthat there is decreased sensation on the right great toe along with just minimal sensation on the second through fifth toes. The left side patient does report some degree of sensation in the great toe and sensation present in the remainder of the toes. She reports a subjective difference with the left sensation being somewhat stronger than the right. Testing reflexes a seems to be slightly more pronounced at the left patella and left Achilles then on the right.

## 2023-08-22 NOTE — PATIENT INSTRUCTIONS
MO Beasley,    Nice to see you today.  I hope your legs and arm/armpit issue get better.  I have you scheduled for a follow-up with Dr Calderon on 9/5/23 at 8:30am.  We will check some labs today and get back to you with the results.  If you need to change your appointment please call us at .    Have a nice day.    Keenan and Dr Calderon.    Ney Beasley,    Que chahal florina gordon.  Espero que stone problema de piernas y brazo/axila mejore.  Le programé un seguimiento con el Dr. Calderon el 5/9/23 a las 8:30 a. m.  Revisaremos algunos laboratorios hoy y nos pondremos en contacto con usted con los resultados.  Si necesita cambiar stone leida por favor llámenos al .    Que tenga un rex leslie.    Keenan y el Dr. Calderon.

## 2023-08-31 ENCOUNTER — HOSPITAL ENCOUNTER (OUTPATIENT)
Dept: MAMMOGRAPHY | Facility: CLINIC | Age: 63
Discharge: HOME | End: 2023-08-31
Attending: FAMILY MEDICINE
Payer: COMMERCIAL

## 2023-08-31 DIAGNOSIS — M79.622 LEFT AXILLARY PAIN: ICD-10-CM

## 2023-08-31 PROCEDURE — 76882 US LMTD JT/FCL EVL NVASC XTR: CPT | Mod: LT

## 2023-08-31 PROCEDURE — 77066 DX MAMMO INCL CAD BI: CPT

## 2023-09-05 ENCOUNTER — OFFICE VISIT (OUTPATIENT)
Dept: FAMILY MEDICINE | Facility: CLINIC | Age: 63
End: 2023-09-05
Payer: COMMERCIAL

## 2023-09-05 VITALS
SYSTOLIC BLOOD PRESSURE: 126 MMHG | HEIGHT: 62 IN | WEIGHT: 166.5 LBS | TEMPERATURE: 98.6 F | BODY MASS INDEX: 30.64 KG/M2 | RESPIRATION RATE: 18 BRPM | OXYGEN SATURATION: 98 % | DIASTOLIC BLOOD PRESSURE: 74 MMHG | HEART RATE: 64 BPM

## 2023-09-05 DIAGNOSIS — K29.70 GASTRITIS WITHOUT BLEEDING, UNSPECIFIED CHRONICITY, UNSPECIFIED GASTRITIS TYPE: ICD-10-CM

## 2023-09-05 DIAGNOSIS — M79.622 LEFT AXILLARY PAIN: Primary | ICD-10-CM

## 2023-09-05 DIAGNOSIS — G62.9 NEUROPATHY: ICD-10-CM

## 2023-09-05 PROCEDURE — 99214 OFFICE O/P EST MOD 30 MIN: CPT | Performed by: FAMILY MEDICINE

## 2023-09-05 ASSESSMENT — PAIN SCALES - GENERAL: PAINLEVEL: NO PAIN (0)

## 2023-09-05 NOTE — PROGRESS NOTES
"Gale Nash  /74   Pulse 64   Temp 98.6  F (37  C)   Resp 18   Ht 1.562 m (5' 1.5\")   Wt 75.5 kg (166 lb 8 oz)   SpO2 98%   BMI 30.95 kg/m       Assessment/Plan:                Gale was seen today for recheck.    Diagnoses and all orders for this visit:    Type 2 diabetes mellitus, without long-term current use of insulin (H)    Screen for colon cancer    Cervical cancer screening    Left axillary pain  -     diclofenac (VOLTAREN) 1 % topical gel; Apply 4 g topically 4 times daily    Neuropathy    Gastritis without bleeding, unspecified chronicity, unspecified gastritis type  -     Adult GI  Referral - Procedure Only; Future  -     omeprazole (PRILOSEC) 20 MG DR capsule; Take 1 capsule (20 mg) by mouth 2 times daily    Other orders  -     PRIMARY CARE FOLLOW-UP SCHEDULING; Future         DISCUSSION  see discussion below.    Axillary pain tried topical diclofenac gel monitor closely consider further workup and/or physical therapy if warranted in the future. Will follow-up in one month to reassess.    For persistent gastritis with recent H. pylori treatment increase omeprazole to twice daily, refine diet as discussed below. Sent for endoscopy. Follow-up in a month sooner if symptoms worsen.    Neuropathy likely from diabetes uncertain symptoms have largely resolved. Cease further use of gabapentin. If symptoms return consider taking at bedtime to see if it helps and/or arranging for EMG. Will follow-up in a month to make sure it is not a persistent tissue.  Subjective:     HPI:    Gale Nash is a 63 year old female medical history includes type II diabetes, hypothyroidism, recent treatment for H. pylori is here today for follow-up.     used for visit.    At her last visit which occurred August 22, 2023 patient complained of left axillary pain, neuropathy in the legs and worsening gastritis/epigastric pain.     For the neuropathy I prescribe gabapentin, " check laboratory testing and arrange for EMG. Patient was never contacted to undergo EMG test. She took gabapentin reports that it made her feel somewhat tired and dizzy but she did take it during the course of the day and not at bedtime as recommended. She reports after week her symptoms really seemed to subside completely. Her symptoms are not present any significant extent at this time. She is reassured by normal laboratory testing. She did not want to pursue the EMG at this time which I think is reasonable. She will forgo further use of gabapentin if symptoms return she can try taking it at night. We will follow-up in about a month to reassess.     For epigastric abdominal discomfort she reports improvement with starting PPI. She notes that coffee in red meats tend to trigger worsening symptoms. Patient is encouraged to avoid dietary things that tend to trigger symptoms she should continue to take the PPI in the morning we will add an evening dose given her persistent symptoms and somewhat complex course with recent H. pylori treatment we will send her for endoscopy with [facility]. If she has any worsening symptoms she will let me know. Last hemoglobin obtained on August 22 was normal.    She had pain in the left axillary region. She thought she had felt the lump I did not note much on exam but felt there was some tenderness in musculature in the area. She did not had a routine mammogram and was scheduled for a routine mammogram the following day I recommend we change that to a diagnostic with ultrasound which she had completed. They did not find any significant concern. Pain comes and goes is relatively similar to prior. Most likely etiology is a musculoskeletal pain symptom. Discussed topical Voltaren gel.        ROS:  Complete review of systems is obtained.  Other than the specific considerations noted above complete review of systems is negative.    Objective:   Medications:  Current  Outpatient Medications   Medication    acetaminophen (TYLENOL) 500 MG tablet    atorvastatin (LIPITOR) 20 MG tablet    blood glucose (CONTOUR NEXT TEST) test strip    Contour Next EZ (CONTOUR NEXT EZ W/DEVICE KIT) w/Device KIT    diclofenac (VOLTAREN) 1 % topical gel    gabapentin (NEURONTIN) 300 MG capsule    levothyroxine (SYNTHROID, LEVOTHROID) 25 MCG tablet    losartan (COZAAR) 100 MG tablet    metFORMIN (GLUCOPHAGE-XR) 500 MG 24 hr tablet    omeprazole (PRILOSEC) 20 MG DR capsule    ondansetron (ZOFRAN) 4 MG tablet    TRUEplus Lancets 28G MISC     No current facility-administered medications for this visit.        Allergies:     Allergies   Allergen Reactions    Penicillins Hives and Rash     Tolerates cephalosporins    Cephalosporins Rash        Social History     Socioeconomic History    Marital status:      Spouse name: Not on file    Number of children: Not on file    Years of education: Not on file    Highest education level: Not on file   Occupational History    Not on file   Tobacco Use    Smoking status: Never    Smokeless tobacco: Never   Vaping Use    Vaping Use: Never used   Substance and Sexual Activity    Alcohol use: No    Drug use: No    Sexual activity: Not on file   Other Topics Concern    Not on file   Social History Narrative    Not on file     Social Determinants of Health     Financial Resource Strain: Not on file   Food Insecurity: Not on file   Transportation Needs: Not on file   Physical Activity: Not on file   Stress: Not on file   Social Connections: Not on file   Intimate Partner Violence: Not on file   Housing Stability: Not on file       Family History   Problem Relation Age of Onset    Coronary Artery Disease Brother     Diabetes Mother     Hyperlipidemia Father         Most Recent Immunizations   Administered Date(s) Administered    COVID-19 MONOVALENT 12+ (Pfizer) 03/04/2022    FLU 6-35 months 12/29/2013    Influenza Vaccine 18-64 (Flublok) 04/28/2023    Influenza Vaccine  ">6 months (Alfuria,Fluzone) 12/28/2021    TDAP (Adacel,Boostrix) 08/08/2016    Typhoid IM 04/28/2023        Wt Readings from Last 3 Encounters:   09/05/23 75.5 kg (166 lb 8 oz)   08/22/23 74.8 kg (165 lb)   04/28/23 74.3 kg (163 lb 14.4 oz)        BP Readings from Last 6 Encounters:   09/05/23 126/74   08/22/23 128/76   04/28/23 124/78   03/31/23 135/73   03/24/23 134/78        Hemoglobin A1C   Date Value Ref Range Status   08/22/2023 6.2 (H) 0.0 - 5.6 % Final     Comment:     Normal <5.7%   Prediabetes 5.7-6.4%    Diabetes 6.5% or higher     Note: Adopted from ADA consensus guidelines.   05/19/2021 6.2 (A) <6.0 % Final          PHYSICAL EXAM:    /74   Pulse 64   Temp 98.6  F (37  C)   Resp 18   Ht 1.562 m (5' 1.5\")   Wt 75.5 kg (166 lb 8 oz)   SpO2 98%   BMI 30.95 kg/m           General Appearance:    Alert, cooperative, no distress   Eyes:   No scleral icterus or conjunctival irritation       Lungs:     Clear to auscultation bilaterally, respirations unlabored, no wheezes or crackles   Heart:    Regular rate and rhythm,  No murmur   Abdomen:    Soft, no distention, patient reports more significant tenderness in the epigastric but she has no rebound tenderness or guarding no palpable organomegaly or masses.     Extremities:  No edema, no joint swelling or redness, no evidence of any injuries   Skin:  No concerning skin findings, no suspicious moles, no rashes   Neurologic:  On gross examination there is no motor or sensory deficit.  Patient walks with a normal gait                                     "

## 2023-09-05 NOTE — PATIENT INSTRUCTIONS
Que chahal verte hoy.    El Dr. Calderon recomienda un seguimiento en 1 mes.    Que tengas robel buena semana.    -Keenan y el Dr. Calderon.

## 2023-09-26 ENCOUNTER — TELEPHONE (OUTPATIENT)
Dept: FAMILY MEDICINE | Facility: CLINIC | Age: 63
End: 2023-09-26

## 2023-09-26 NOTE — TELEPHONE ENCOUNTER
PA Initiation    Medication:   omeprazole (PRILOSEC) 20 MG DR capsule        Insurance Company:  [facility]  Pharmacy Filling the Rx: [facility]    Filling Pharmacy Phone:  [phone]  Filling Pharmacy Fax:  [phone]  Start Date:   09-

## 2023-09-28 NOTE — TELEPHONE ENCOUNTER
PA Initiation    Medication: OMEPRAZOLE 20 MG PO CPDR  Insurance Company: [email or web address] - Phone [phone] Fax [phone]  Pharmacy Filling the Rx: [id number] [place] [address] AT [place] [address]  Filling Pharmacy Phone: [phone]  Start Date: 9/28/2023

## 2023-12-11 ENCOUNTER — APPOINTMENT (OUTPATIENT)
Dept: INTERPRETER SERVICES | Facility: CLINIC | Age: 63
End: 2023-12-11

## 2023-12-21 ENCOUNTER — TRANSFERRED RECORDS (OUTPATIENT)
Dept: HEALTH INFORMATION MANAGEMENT | Facility: CLINIC | Age: 63
End: 2023-12-21

## 2023-12-22 ENCOUNTER — TRANSFERRED RECORDS (OUTPATIENT)
Dept: HEALTH INFORMATION MANAGEMENT | Facility: CLINIC | Age: 63
End: 2023-12-22

## 2023-12-28 ENCOUNTER — TRANSFERRED RECORDS (OUTPATIENT)
Dept: HEALTH INFORMATION MANAGEMENT | Facility: CLINIC | Age: 63
End: 2023-12-28

## 2023-12-28 ENCOUNTER — MEDICAL CORRESPONDENCE (OUTPATIENT)
Dept: HEALTH INFORMATION MANAGEMENT | Facility: CLINIC | Age: 63
End: 2023-12-28

## 2023-12-29 ENCOUNTER — TRANSCRIBE ORDERS (OUTPATIENT)
Dept: OTHER | Age: 63
End: 2023-12-29

## 2023-12-29 DIAGNOSIS — B96.81 HELICOBACTER PYLORI GASTRITIS: Primary | ICD-10-CM

## 2023-12-29 DIAGNOSIS — K29.70 HELICOBACTER PYLORI GASTRITIS: Primary | ICD-10-CM

## 2024-01-03 ENCOUNTER — OFFICE VISIT (OUTPATIENT)
Dept: INFECTIOUS DISEASES | Facility: CLINIC | Age: 64
End: 2024-01-03
Payer: COMMERCIAL

## 2024-01-03 VITALS
SYSTOLIC BLOOD PRESSURE: 110 MMHG | HEART RATE: 69 BPM | TEMPERATURE: 97.8 F | BODY MASS INDEX: 31.47 KG/M2 | DIASTOLIC BLOOD PRESSURE: 78 MMHG | WEIGHT: 169.3 LBS | OXYGEN SATURATION: 97 %

## 2024-01-03 DIAGNOSIS — L27.0 RASH, DRUG: Primary | ICD-10-CM

## 2024-01-03 DIAGNOSIS — A04.8 H. PYLORI INFECTION: ICD-10-CM

## 2024-01-03 DIAGNOSIS — B96.81 HELICOBACTER PYLORI GASTRITIS: ICD-10-CM

## 2024-01-03 DIAGNOSIS — K29.70 HELICOBACTER PYLORI GASTRITIS: ICD-10-CM

## 2024-01-03 PROCEDURE — 99204 OFFICE O/P NEW MOD 45 MIN: CPT | Performed by: INTERNAL MEDICINE

## 2024-01-03 RX ORDER — LORATADINE 10 MG/1
10 TABLET ORAL DAILY
Qty: 14 TABLET | Refills: 1 | Status: SHIPPED | OUTPATIENT
Start: 2024-01-03 | End: 2024-01-17

## 2024-01-03 RX ORDER — CLARITHROMYCIN 500 MG
500 TABLET ORAL 2 TIMES DAILY
Qty: 20 TABLET | Refills: 0 | Status: SHIPPED | OUTPATIENT
Start: 2024-01-03 | End: 2024-01-13

## 2024-01-03 NOTE — PROGRESS NOTES
General ID Service Consult      Patient: Gale Nash  YOB: 1960, MRN: [id number]  Date of Admission:  (Not on file)  Date of Consult: 01/03/2024  Consult Requested by: No att. providers found  Admission Diagnosis: No admission diagnoses are documented for this encounter.  Consult Question: H pylori    ID Assessment & Plan   H. pylori infection  Recent diagnosis  With history of allergy to beta-lactam, and new allergy, rash, suspect related to doxycycline rather than metronidazole very limited options indeed    Plan  Suggest regimen consisting of metronidazole 500 twice daily omeprazole 40 mg twice daily and clarithromycin 500 twice daily.  We discussed there is a chance she will react to metronidazole. There was no signs of tongue swelling, facial swelling or SOB or hives with recent rash.    Recommend that she start with metronidazole while on Claritin, if no reaction add omeprazole if no reaction at the clarithromycin  10 days regimen  If no reaction follow with GI for follow-up breathing test  Recommended as well she switch from the Allegra to Claritin plus using moisturizers,etc    Elvi Tierney MD    ______________________________________________________________________      History of Present Illness   This is a 64 years old female referred by GI for H. pylori infection and drug allergies  She is known allergic to penicillin and cephalosporin hives listed.  She recently had endoscopy tested positive for H. pylori and then an antibiotic regimen called in for her consisting of doxycycline metronidazole omeprazole [facility] and.  She says that with the first couple of pills she developed diffuse rash.  Allegra was called in the rash resolved but she still itching apparently.  She has some dry skin as well.        Review of Systems   The 10 point Review of Systems is negative other than noted in the HPI or here.     Past Medical History    Past Medical History:   Diagnosis Date     Acquired hypothyroidism 5/29/2021    Back pain     Diabetes mellitus (H)     Disease of thyroid gland     hypo    Hyperlipidemia     Hypertension     Sleep apnea     no CPAP    Type 2 diabetes mellitus, without long-term current use of insulin (H) 5/29/2021       Past Surgical History   Past Surgical History:   Procedure Laterality Date    LAPAROSCOPIC CHOLECYSTECTOMY N/A 5/26/2021    Procedure: LAPAROSCOPIC CHOLECYSTECTOMY;  Surgeon: Denis Herman MD;  Location: [facility];  Service: General    TUBAL LIGATION         Social History   Social History     Tobacco Use    Smoking status: Never    Smokeless tobacco: Never   Vaping Use    Vaping Use: Never used   Substance Use Topics    Alcohol use: No    Drug use: No       Family History   I have reviewed this patient's family history and updated it with pertinent information if needed.  Family History   Problem Relation Age of Onset    Coronary Artery Disease Brother     Diabetes Mother     Hyperlipidemia Father        Medications   I have reviewed this patient's current medications    Allergies   Allergies   Allergen Reactions    Penicillins Hives and Rash     Tolerates cephalosporins    Cephalosporins Rash       Physical Exam   Vital Signs:                    Weight: 0 lbs 0 oz  Gen. appearance nontoxic  Eyes no conjunctivitis or icterus  Neck no stiffness or neck vein distention  Heart  No edema  Lungs breathing comfortably  Abdomen soft not tender  Extremities no synovitis, trace edema  Skin  no rash or emboli  Neurologic alert oriented no focal deficits    Data   Inflammatory Markers No lab results found.     Hematology Studies   Recent Labs   Lab Test 08/22/23  1056 03/24/23  1030 05/30/21  0617 05/29/21  2223 05/29/21  1920 03/19/19  2037   WBC 11.7* 9.8 11.6* 14.3* 14.3* 10.3   HGB 11.9 12.9 11.4* 11.7* 12.4 12.1   MCV 86 86 88 87 87 88    318 288 293 312 263       Metabolic Studies   Recent Labs   Lab Test 08/22/23  1056 03/24/23  1030  "05/30/21  0617 05/29/21 2223 05/29/21 1920    140 139 138 138   POTASSIUM 3.9 4.5 3.8 4.0 4.1   CHLORIDE 103 105 107 106 104   CO2 22 25 24 26 26   BUN 11.9 11.4 7* 9 9   CR 0.59 0.61 0.65 0.67 0.71   GFRESTIMATED >90 >90 >60 >60 >60       Hepatic Studies    Recent Labs   Lab Test 08/22/23  1056 03/24/23  1030 05/30/21  0617 05/29/21 2223 05/29/21 1920 03/19/19 2037   BILITOTAL 0.5 0.6 0.7 0.7 0.6 0.3   ALKPHOS 88 96 77 83 92 108   ALBUMIN 4.2 4.4 3.0* 3.3* 3.5 3.7   AST 28 23 15 18 20 26   ALT 20 17 19 22 23 22       Most Recent 6 Bacteria Isolates From Any Culture (See EPIC Reports for Culture Details):No lab results found.    Urine Studies    Recent Labs   Lab Test 07/26/18  1555   LEUKEST Negative   WBCU 0-5       Vancomycin Levels  No lab results found.    Invalid input(s): \"VANCO\"    Hepatitis B Testing No lab results found.  Hepatitis C Testing   No results found for: \"HCVAB\", \"HQTG\", \"HCGENO\", \"HCPCR\", \"HQTRNA\", \"HEPRNA\"  HIVTesting No lab results found.    Respiratory Virus Testing    No results found for: \"RS\", \"FLUAG\"  COVID-19 Antibody Results, Testing for Immunity           No data to display              COVID-19 PCR Results          11/18/2022    12:18 3/4/2023    10:50   COVID-19 PCR Results   COVID-19 Virus by PCR (External Result) Negative     Negative          Details          This result is from an external source.               "

## 2024-01-31 ENCOUNTER — TRANSFERRED RECORDS (OUTPATIENT)
Dept: HEALTH INFORMATION MANAGEMENT | Facility: CLINIC | Age: 64
End: 2024-01-31
Payer: MEDICAID

## 2024-03-05 ENCOUNTER — PATIENT OUTREACH (OUTPATIENT)
Dept: CARE COORDINATION | Facility: CLINIC | Age: 64
End: 2024-03-05
Payer: MEDICAID

## 2024-03-19 ENCOUNTER — PATIENT OUTREACH (OUTPATIENT)
Dept: CARE COORDINATION | Facility: CLINIC | Age: 64
End: 2024-03-19
Payer: MEDICAID

## 2024-04-02 PROBLEM — B96.81 HELICOBACTER PYLORI GASTRITIS: Status: ACTIVE | Noted: 2024-04-02

## 2024-04-02 PROBLEM — K29.70 GASTRITIS: Status: ACTIVE | Noted: 2023-12-22

## 2024-04-02 PROBLEM — K29.70 HELICOBACTER PYLORI GASTRITIS: Status: ACTIVE | Noted: 2024-04-02

## 2024-04-02 PROBLEM — B96.89 DISORDER ASSOCIATED WITH HELICOBACTER SPECIES: Status: ACTIVE | Noted: 2023-12-22

## 2024-06-04 ENCOUNTER — LAB REQUISITION (OUTPATIENT)
Dept: LAB | Facility: CLINIC | Age: 64
End: 2024-06-04
Payer: COMMERCIAL

## 2024-06-04 DIAGNOSIS — E03.9 HYPOTHYROIDISM, UNSPECIFIED: ICD-10-CM

## 2024-06-04 DIAGNOSIS — E78.5 HYPERLIPIDEMIA, UNSPECIFIED: ICD-10-CM

## 2024-06-04 PROCEDURE — 80061 LIPID PANEL: CPT | Mod: ORL | Performed by: FAMILY MEDICINE

## 2024-06-04 PROCEDURE — 84443 ASSAY THYROID STIM HORMONE: CPT | Mod: ORL | Performed by: FAMILY MEDICINE

## 2024-06-04 PROCEDURE — 80053 COMPREHEN METABOLIC PANEL: CPT | Mod: ORL | Performed by: FAMILY MEDICINE

## 2024-06-05 LAB
ALBUMIN SERPL BCG-MCNC: 4.2 G/DL (ref 3.5–5.2)
ALP SERPL-CCNC: 94 U/L (ref 40–150)
ALT SERPL W P-5'-P-CCNC: 15 U/L (ref 0–50)
ANION GAP SERPL CALCULATED.3IONS-SCNC: 11 MMOL/L (ref 7–15)
AST SERPL W P-5'-P-CCNC: 17 U/L (ref 0–45)
BILIRUB SERPL-MCNC: 0.4 MG/DL
BUN SERPL-MCNC: 16.3 MG/DL (ref 8–23)
CALCIUM SERPL-MCNC: 9.5 MG/DL (ref 8.8–10.2)
CHLORIDE SERPL-SCNC: 105 MMOL/L (ref 98–107)
CHOLEST SERPL-MCNC: 187 MG/DL
CREAT SERPL-MCNC: 0.69 MG/DL (ref 0.51–0.95)
DEPRECATED HCO3 PLAS-SCNC: 24 MMOL/L (ref 22–29)
EGFRCR SERPLBLD CKD-EPI 2021: >90 ML/MIN/1.73M2
FASTING STATUS PATIENT QL REPORTED: ABNORMAL
FASTING STATUS PATIENT QL REPORTED: ABNORMAL
GLUCOSE SERPL-MCNC: 103 MG/DL (ref 70–99)
HDLC SERPL-MCNC: 47 MG/DL
LDLC SERPL CALC-MCNC: 102 MG/DL
NONHDLC SERPL-MCNC: 140 MG/DL
POTASSIUM SERPL-SCNC: 3.7 MMOL/L (ref 3.4–5.3)
PROT SERPL-MCNC: 7.3 G/DL (ref 6.4–8.3)
SODIUM SERPL-SCNC: 140 MMOL/L (ref 135–145)
TRIGL SERPL-MCNC: 189 MG/DL
TSH SERPL DL<=0.005 MIU/L-ACNC: 2.58 UIU/ML (ref 0.3–4.2)

## 2024-07-31 ENCOUNTER — LAB REQUISITION (OUTPATIENT)
Dept: LAB | Facility: CLINIC | Age: 64
End: 2024-07-31
Payer: COMMERCIAL

## 2024-07-31 DIAGNOSIS — K27.9 PEPTIC ULCER, SITE UNSPECIFIED, UNSPECIFIED AS ACUTE OR CHRONIC, WITHOUT HEMORRHAGE OR PERFORATION: ICD-10-CM

## 2024-07-31 PROCEDURE — 87338 HPYLORI STOOL AG IA: CPT | Mod: ORL | Performed by: FAMILY MEDICINE

## 2024-08-01 LAB — H PYLORI AG STL QL IA: NEGATIVE

## 2024-09-08 ENCOUNTER — APPOINTMENT (OUTPATIENT)
Dept: RADIOLOGY | Facility: CLINIC | Age: 64
End: 2024-09-08
Attending: EMERGENCY MEDICINE
Payer: COMMERCIAL

## 2024-09-08 ENCOUNTER — HOSPITAL ENCOUNTER (EMERGENCY)
Facility: CLINIC | Age: 64
Discharge: HOME | End: 2024-09-08
Attending: EMERGENCY MEDICINE | Admitting: EMERGENCY MEDICINE
Payer: COMMERCIAL

## 2024-09-08 VITALS
DIASTOLIC BLOOD PRESSURE: 79 MMHG | TEMPERATURE: 98.3 F | RESPIRATION RATE: 18 BRPM | HEART RATE: 66 BPM | WEIGHT: 170 LBS | SYSTOLIC BLOOD PRESSURE: 157 MMHG | OXYGEN SATURATION: 98 % | BODY MASS INDEX: 31.6 KG/M2

## 2024-09-08 DIAGNOSIS — R07.89 ATYPICAL CHEST PAIN: ICD-10-CM

## 2024-09-08 DIAGNOSIS — G44.209 TENSION HEADACHE: ICD-10-CM

## 2024-09-08 LAB
ALBUMIN SERPL BCG-MCNC: 4.2 G/DL (ref 3.5–5.2)
ALP SERPL-CCNC: 112 U/L (ref 40–150)
ALT SERPL W P-5'-P-CCNC: 18 U/L (ref 0–50)
ANION GAP SERPL CALCULATED.3IONS-SCNC: 12 MMOL/L (ref 7–15)
AST SERPL W P-5'-P-CCNC: 21 U/L (ref 0–45)
ATRIAL RATE - MUSE: 72 BPM
BASOPHILS # BLD AUTO: 0 10E3/UL (ref 0–0.2)
BASOPHILS NFR BLD AUTO: 0 %
BILIRUB DIRECT SERPL-MCNC: <0.2 MG/DL (ref 0–0.3)
BILIRUB SERPL-MCNC: 0.3 MG/DL
BUN SERPL-MCNC: 12.3 MG/DL (ref 8–23)
CALCIUM SERPL-MCNC: 9.5 MG/DL (ref 8.8–10.4)
CHLORIDE SERPL-SCNC: 104 MMOL/L (ref 98–107)
CREAT SERPL-MCNC: 0.69 MG/DL (ref 0.51–0.95)
D DIMER PPP FEU-MCNC: 0.39 UG/ML FEU (ref 0–0.5)
DIASTOLIC BLOOD PRESSURE - MUSE: NORMAL MMHG
EGFRCR SERPLBLD CKD-EPI 2021: >90 ML/MIN/1.73M2
EOSINOPHIL # BLD AUTO: 0.3 10E3/UL (ref 0–0.7)
EOSINOPHIL NFR BLD AUTO: 3 %
ERYTHROCYTE [DISTWIDTH] IN BLOOD BY AUTOMATED COUNT: 13.4 % (ref 10–15)
GLUCOSE SERPL-MCNC: 95 MG/DL (ref 70–99)
HCO3 SERPL-SCNC: 24 MMOL/L (ref 22–29)
HCT VFR BLD AUTO: 36.8 % (ref 35–47)
HGB BLD-MCNC: 12.4 G/DL (ref 11.7–15.7)
IMM GRANULOCYTES # BLD: 0 10E3/UL
IMM GRANULOCYTES NFR BLD: 0 %
INTERPRETATION ECG - MUSE: NORMAL
LIPASE SERPL-CCNC: 53 U/L (ref 13–60)
LYMPHOCYTES # BLD AUTO: 4.1 10E3/UL (ref 0.8–5.3)
LYMPHOCYTES NFR BLD AUTO: 43 %
MAGNESIUM SERPL-MCNC: 2 MG/DL (ref 1.7–2.3)
MCH RBC QN AUTO: 28.9 PG (ref 26.5–33)
MCHC RBC AUTO-ENTMCNC: 33.7 G/DL (ref 31.5–36.5)
MCV RBC AUTO: 86 FL (ref 78–100)
MONOCYTES # BLD AUTO: 0.7 10E3/UL (ref 0–1.3)
MONOCYTES NFR BLD AUTO: 7 %
NEUTROPHILS # BLD AUTO: 4.5 10E3/UL (ref 1.6–8.3)
NEUTROPHILS NFR BLD AUTO: 47 %
NRBC # BLD AUTO: 0 10E3/UL
NRBC BLD AUTO-RTO: 0 /100
P AXIS - MUSE: 68 DEGREES
PLATELET # BLD AUTO: 289 10E3/UL (ref 150–450)
POTASSIUM SERPL-SCNC: 3.9 MMOL/L (ref 3.4–5.3)
PR INTERVAL - MUSE: 136 MS
PROT SERPL-MCNC: 7.1 G/DL (ref 6.4–8.3)
QRS DURATION - MUSE: 84 MS
QT - MUSE: 408 MS
QTC - MUSE: 446 MS
R AXIS - MUSE: 44 DEGREES
RBC # BLD AUTO: 4.29 10E6/UL (ref 3.8–5.2)
SODIUM SERPL-SCNC: 140 MMOL/L (ref 135–145)
SYSTOLIC BLOOD PRESSURE - MUSE: NORMAL MMHG
T AXIS - MUSE: 141 DEGREES
TROPONIN T SERPL HS-MCNC: 7 NG/L
VENTRICULAR RATE- MUSE: 72 BPM
WBC # BLD AUTO: 9.6 10E3/UL (ref 4–11)

## 2024-09-08 PROCEDURE — 99285 EMERGENCY DEPT VISIT HI MDM: CPT | Mod: 25 | Performed by: EMERGENCY MEDICINE

## 2024-09-08 PROCEDURE — 85379 FIBRIN DEGRADATION QUANT: CPT | Performed by: EMERGENCY MEDICINE

## 2024-09-08 PROCEDURE — 84484 ASSAY OF TROPONIN QUANT: CPT | Performed by: EMERGENCY MEDICINE

## 2024-09-08 PROCEDURE — 96361 HYDRATE IV INFUSION ADD-ON: CPT | Performed by: EMERGENCY MEDICINE

## 2024-09-08 PROCEDURE — 36415 COLL VENOUS BLD VENIPUNCTURE: CPT | Performed by: EMERGENCY MEDICINE

## 2024-09-08 PROCEDURE — 258N000003 HC RX IP 258 OP 636: Performed by: EMERGENCY MEDICINE

## 2024-09-08 PROCEDURE — 96374 THER/PROPH/DIAG INJ IV PUSH: CPT | Performed by: EMERGENCY MEDICINE

## 2024-09-08 PROCEDURE — 96375 TX/PRO/DX INJ NEW DRUG ADDON: CPT | Performed by: EMERGENCY MEDICINE

## 2024-09-08 PROCEDURE — 83690 ASSAY OF LIPASE: CPT | Performed by: EMERGENCY MEDICINE

## 2024-09-08 PROCEDURE — 250N000011 HC RX IP 250 OP 636: Performed by: EMERGENCY MEDICINE

## 2024-09-08 PROCEDURE — 71046 X-RAY EXAM CHEST 2 VIEWS: CPT

## 2024-09-08 PROCEDURE — 80053 COMPREHEN METABOLIC PANEL: CPT | Performed by: EMERGENCY MEDICINE

## 2024-09-08 PROCEDURE — 93005 ELECTROCARDIOGRAM TRACING: CPT | Performed by: EMERGENCY MEDICINE

## 2024-09-08 PROCEDURE — 83735 ASSAY OF MAGNESIUM: CPT | Performed by: EMERGENCY MEDICINE

## 2024-09-08 PROCEDURE — 85025 COMPLETE CBC W/AUTO DIFF WBC: CPT | Performed by: EMERGENCY MEDICINE

## 2024-09-08 RX ORDER — KETOROLAC TROMETHAMINE 15 MG/ML
15 INJECTION, SOLUTION INTRAMUSCULAR; INTRAVENOUS ONCE
Status: COMPLETED | OUTPATIENT
Start: 2024-09-08 | End: 2024-09-08

## 2024-09-08 RX ORDER — DEXAMETHASONE SODIUM PHOSPHATE 10 MG/ML
6 INJECTION, SOLUTION INTRAMUSCULAR; INTRAVENOUS ONCE
Status: COMPLETED | OUTPATIENT
Start: 2024-09-08 | End: 2024-09-08

## 2024-09-08 RX ADMIN — KETOROLAC TROMETHAMINE 15 MG: 15 INJECTION, SOLUTION INTRAMUSCULAR; INTRAVENOUS at 16:53

## 2024-09-08 RX ADMIN — DEXAMETHASONE SODIUM PHOSPHATE 6 MG: 10 INJECTION INTRAMUSCULAR; INTRAVENOUS at 18:45

## 2024-09-08 RX ADMIN — SODIUM CHLORIDE 1000 ML: 9 INJECTION, SOLUTION INTRAVENOUS at 16:50

## 2024-09-08 ASSESSMENT — ACTIVITIES OF DAILY LIVING (ADL)
ADLS_ACUITY_SCORE: 35

## 2024-09-08 NOTE — ED PROVIDER NOTES
"  Emergency Department Encounter     Evaluation Date & Time:   9/8/2024  4:07 PM    CHIEF COMPLAINT:  Headache and Chest Pain      Triage Note:       ED COURSE & MEDICAL DECISION MAKING:     Pt here for ongoing posterior HA for the past 8 days, seen at  on 9/3/24 with negative workup, including labs and CT brain.  Pt returns with recurrent \"burning pain\" to posterior head, but also had chest tightness this morning.  No real dyspnea or cough or fevers.  No signs of dvt, but pain is pleuritic in this 65 yo, so will get d-dimer for low risk PE evaluation. EKG unchanged from previous.  Treating for HA and will reassess.  Pt has no meningismus, no neuro deficits or signs of more nefarious intracranial process.   used throughout exam/history.    ED Course as of 09/08/24 2232   Sun Sep 08, 2024   1751 D-dimer negative, PE ruled out.   No indication for CTA chest.    hsTrop neg with atypical symptoms for > 6 hours, ACS ruled out per protocol. Rest of labs unremarkable, reassuring.     1823 CXR (independent interpretation): no acute cardiopulmonary process    1838 I updated pt with . Feels much better after meds. Will give a dose of IV decadron, discharge, outpatient follow up advised.  Pt and family reassured.         Medical Decision Making    History:  Supplemental history from: Documented in chart  External Record(s) reviewed: Outpatient Record: [facility] visit on 9/3/24    Work Up:  Chart documentation includes differential considered and any EKGs or imaging independently interpreted by provider, where specified.  In additional to work up documented, I considered the following work up: Documented in chart, if applicable.    External consultation:  Discussion of management with another provider: Documented in chart, if applicable    Complicating factors:  Care impacted by chronic illness: Diabetes, Hyperlipidemia, and Hypertension  Care affected by social determinants of health: " N/A    Disposition considerations: Discharge. No recommendations on prescription strength medication(s). I considered admission, but ultimately discharged patient after reassuring workup, improvement.         At the conclusion of the encounter I discussed the results of all the tests and the disposition. The questions were answered. The patient or family acknowledged understanding and was agreeable with the care plan.      MEDICATIONS GIVEN IN THE EMERGENCY DEPARTMENT:  Medications   sodium chloride 0.9% BOLUS 1,000 mL (0 mLs Intravenous Stopped 9/8/24 1802)   ketorolac (TORADOL) injection 15 mg (15 mg Intravenous $Given 9/8/24 1653)   prochlorperazine (COMPAZINE) injection 10 mg (10 mg Intravenous Not Given 9/8/24 1651)   dexAMETHasone PF (DECADRON) injection 6 mg (6 mg Intravenous $Given 9/8/24 1845)       NEW PRESCRIPTIONS STARTED AT TODAY'S ED VISIT:  Discharge Medication List as of 9/8/2024  6:54 PM          HPI   HPI     Gale Nash is a 64 year old female with a pertinent history of DM, HTN, HLD who presents to this ED via walk-in with spouse for evaluation of chest tightness and posterior HA. Pt reports HA described as burning, lasting for 8 days, worse again this morning. No n/v or photophobia. Denies neck pain/stiffness. Pt denies numbness/weakness, vision changes, difficulty with speech/gait.  She was seen at [facility] on 9/3/24 and had negative workup, including CT brain. Pt woke up today with some chest tightness, worse with inspiration, so she came in now.  She denies leg pain/swelling and no travel/surgery or hx of dvt/pe.      REVIEW OF SYSTEMS:  See HPI      Medical History     Past Medical History:   Diagnosis Date    Acquired hypothyroidism 5/29/2021    Back pain     Diabetes mellitus (H)     Disease of thyroid gland     Hyperlipidemia     Hypertension     Sleep apnea     Type 2 diabetes mellitus, without long-term current use of insulin (H) 5/29/2021       Past Surgical History:    Procedure Laterality Date    LAPAROSCOPIC CHOLECYSTECTOMY N/A 5/26/2021    Procedure: LAPAROSCOPIC CHOLECYSTECTOMY;  Surgeon: Denis Herman MD;  Location: [facility] Main OR;  Service: General    TUBAL LIGATION         Family History   Problem Relation Age of Onset    Coronary Artery Disease Brother     Diabetes Mother     Hyperlipidemia Father        Social History     Tobacco Use    Smoking status: Never    Smokeless tobacco: Never   Vaping Use    Vaping status: Never Used   Substance Use Topics    Alcohol use: No    Drug use: No       acetaminophen (TYLENOL) 500 MG tablet  atorvastatin (LIPITOR) 20 MG tablet  blood glucose (CONTOUR NEXT TEST) test strip  Contour Next EZ (CONTOUR NEXT EZ W/DEVICE KIT) w/Device KIT  diclofenac (VOLTAREN) 1 % topical gel  gabapentin (NEURONTIN) 300 MG capsule  levothyroxine (SYNTHROID, LEVOTHROID) 25 MCG tablet  losartan (COZAAR) 100 MG tablet  metFORMIN (GLUCOPHAGE-XR) 500 MG 24 hr tablet  omeprazole (PRILOSEC) 20 MG DR capsule  ondansetron (ZOFRAN) 4 MG tablet  TRUEplus Lancets 28G MISC        Physical Exam     Vitals:  BP (!) 157/79   Pulse 66   Temp 98.3  F (36.8  C)   Resp 18   Wt 77.1 kg (170 lb)   SpO2 98%   BMI 31.60 kg/m      PHYSICAL EXAM:   Physical Exam  Vitals and nursing note reviewed.   Constitutional:       General: She is not in acute distress.     Appearance: Normal appearance.   HENT:      Head: Normocephalic and atraumatic.      Nose: Nose normal.      Mouth/Throat:      Mouth: Mucous membranes are moist.   Eyes:      Pupils: Pupils are equal, round, and reactive to light.   Neck:      Comments: No meningismus.  No rash on head/neck  Cardiovascular:      Rate and Rhythm: Normal rate and regular rhythm.      Pulses: Normal pulses.           Radial pulses are 2+ on the right side and 2+ on the left side.        Dorsalis pedis pulses are 2+ on the right side and 2+ on the left side.   Pulmonary:      Effort: Pulmonary effort is normal. No respiratory  distress.      Breath sounds: Normal breath sounds.   Abdominal:      Palpations: Abdomen is soft.      Tenderness: There is no abdominal tenderness.   Musculoskeletal:      Cervical back: Full passive range of motion without pain and neck supple.      Comments: No calf tenderness or swelling b/l   Skin:     General: Skin is warm.      Findings: No rash.   Neurological:      General: No focal deficit present.      Mental Status: She is alert and oriented to person, place, and time. Mental status is at baseline.      Cranial Nerves: Cranial nerves 2-12 are intact.      Sensory: Sensation is intact.      Motor: Motor function is intact.      Coordination: Coordination is intact.      Gait: Gait is intact.      Comments: Fluent speech, normal finger to nose b/l, 5/5 strength b/l UE/LE, no acute lateralizing deficits   Psychiatric:         Mood and Affect: Mood normal.         Behavior: Behavior normal.         Results     LAB:  All pertinent labs reviewed and interpreted  Labs Ordered and Resulted from Time of ED Arrival to Time of ED Departure   BASIC METABOLIC PANEL - Normal       Result Value    Sodium 140      Potassium 3.9      Chloride 104      Carbon Dioxide (CO2) 24      Anion Gap 12      Urea Nitrogen 12.3      Creatinine 0.69      GFR Estimate >90      Calcium 9.5      Glucose 95     HEPATIC FUNCTION PANEL - Normal    Protein Total 7.1      Albumin 4.2      Bilirubin Total 0.3      Alkaline Phosphatase 112      AST 21      ALT 18      Bilirubin Direct <0.20     MAGNESIUM - Normal    Magnesium 2.0     TROPONIN T, HIGH SENSITIVITY - Normal    Troponin T, High Sensitivity 7     LIPASE - Normal    Lipase 53     D DIMER QUANTITATIVE - Normal    D-Dimer Quantitative 0.39     CBC WITH PLATELETS AND DIFFERENTIAL    WBC Count 9.6      RBC Count 4.29      Hemoglobin 12.4      Hematocrit 36.8      MCV 86      MCH 28.9      MCHC 33.7      RDW 13.4      Platelet Count 289      % Neutrophils 47      % Lymphocytes 43      %  Monocytes 7      % Eosinophils 3      % Basophils 0      % Immature Granulocytes 0      NRBCs per 100 WBC 0      Absolute Neutrophils 4.5      Absolute Lymphocytes 4.1      Absolute Monocytes 0.7      Absolute Eosinophils 0.3      Absolute Basophils 0.0      Absolute Immature Granulocytes 0.0      Absolute NRBCs 0.0         RADIOLOGY:  XR Chest 2 Views   Final Result   IMPRESSION: Negative chest.                   ECG:  NSR, rate 72, noral intervals, no acute ischemic changes compared to EKG from 3/19/19    I have independently reviewed and interpreted the EKG(s) documented above     PROCEDURES:  Procedures:  none      FINAL IMPRESSION:    ICD-10-CM    1. Tension headache  G44.209       2. Atypical chest pain  R07.89           0 minutes of critical care time        Vladimir Hooper DO  Emergency Medicine  [facility] EMERGENCY ROOM  9/8/2024  4:09 PM          Vladimir Hooper MD  09/08/24 [id number]

## 2024-09-08 NOTE — DISCHARGE INSTRUCTIONS
Follow up with primary clinic for re-evaluation and further cares. Try and rest tonight in dark room.

## 2024-09-08 NOTE — ED TRIAGE NOTES
Pt arrives with occipital headache that has been ongoing for the past week. Went to  on the 3rd and had blood work and a scan with no findings. Sent home with cyclobenzaprine which has had little relief. Also has chest pain that started today.      Triage Assessment (Adult)       Row Name 09/08/24 1608          Triage Assessment    Airway WDL WDL        Respiratory WDL    Respiratory WDL WDL        Skin Circulation/Temperature WDL    Skin Circulation/Temperature WDL WDL        Cardiac WDL    Cardiac WDL chest pain        Peripheral/Neurovascular WDL    Peripheral Neurovascular WDL WDL        Cognitive/Neuro/Behavioral WDL    Cognitive/Neuro/Behavioral WDL WDL

## 2024-09-09 NOTE — ED NOTES
Discharge paperwork discussed with pt and spouse with . Questions were answered to patient satisfaction. Charis Bates RN 9/8/2024 7:04 PM

## 2024-12-07 ENCOUNTER — LAB REQUISITION (OUTPATIENT)
Dept: LAB | Facility: CLINIC | Age: 64
End: 2024-12-07
Payer: COMMERCIAL

## 2024-12-07 PROCEDURE — 87086 URINE CULTURE/COLONY COUNT: CPT | Mod: ORL | Performed by: FAMILY MEDICINE

## 2024-12-08 LAB — BACTERIA UR CULT: NORMAL

## 2024-12-18 ENCOUNTER — PATIENT OUTREACH (OUTPATIENT)
Dept: CARE COORDINATION | Facility: CLINIC | Age: 64
End: 2024-12-18
Payer: COMMERCIAL

## 2024-12-20 ENCOUNTER — LAB REQUISITION (OUTPATIENT)
Dept: LAB | Facility: CLINIC | Age: 64
End: 2024-12-20
Payer: COMMERCIAL

## 2024-12-20 DIAGNOSIS — R30.0 DYSURIA: ICD-10-CM

## 2024-12-20 LAB
ALBUMIN UR-MCNC: 10 MG/DL
APPEARANCE UR: ABNORMAL
BILIRUB UR QL STRIP: NEGATIVE
COLOR UR AUTO: YELLOW
GLUCOSE UR STRIP-MCNC: NEGATIVE MG/DL
HGB UR QL STRIP: ABNORMAL
KETONES UR STRIP-MCNC: NEGATIVE MG/DL
LEUKOCYTE ESTERASE UR QL STRIP: ABNORMAL
MUCOUS THREADS #/AREA URNS LPF: PRESENT /LPF
NITRATE UR QL: NEGATIVE
PH UR STRIP: 5.5 [PH] (ref 5–7)
RBC URINE: 5 /HPF
SP GR UR STRIP: 1.03 (ref 1–1.03)
SQUAMOUS EPITHELIAL: 5 /HPF
UROBILINOGEN UR STRIP-MCNC: NORMAL MG/DL
WBC URINE: 2 /HPF

## 2024-12-20 PROCEDURE — 87086 URINE CULTURE/COLONY COUNT: CPT | Mod: ORL | Performed by: NURSE PRACTITIONER

## 2024-12-21 LAB — BACTERIA UR CULT: NORMAL

## 2025-01-23 ENCOUNTER — LAB REQUISITION (OUTPATIENT)
Dept: LAB | Facility: CLINIC | Age: 65
End: 2025-01-23

## 2025-01-23 DIAGNOSIS — Z12.4 ENCOUNTER FOR SCREENING FOR MALIGNANT NEOPLASM OF CERVIX: ICD-10-CM

## 2025-01-23 PROCEDURE — G0145 SCR C/V CYTO,THINLAYER,RESCR: HCPCS | Performed by: FAMILY MEDICINE

## 2025-01-23 PROCEDURE — 87624 HPV HI-RISK TYP POOLED RSLT: CPT | Performed by: FAMILY MEDICINE

## 2025-01-27 LAB
HPV HR 12 DNA CVX QL NAA+PROBE: NEGATIVE
HPV16 DNA CVX QL NAA+PROBE: NEGATIVE
HPV18 DNA CVX QL NAA+PROBE: NEGATIVE
HUMAN PAPILLOMA VIRUS FINAL DIAGNOSIS: NORMAL

## 2025-01-29 LAB
BKR AP ASSOCIATED HPV REPORT: NORMAL
BKR LAB AP GYN ADEQUACY: NORMAL
BKR LAB AP GYN INTERPRETATION: NORMAL
BKR LAB AP LMP: NORMAL
BKR LAB AP PREVIOUS ABNL DX: NORMAL
BKR LAB AP PREVIOUS ABNORMAL: NORMAL
PATH REPORT.COMMENTS IMP SPEC: NORMAL
PATH REPORT.COMMENTS IMP SPEC: NORMAL
PATH REPORT.RELEVANT HX SPEC: NORMAL

## 2025-04-14 ENCOUNTER — LAB REQUISITION (OUTPATIENT)
Dept: LAB | Facility: CLINIC | Age: 65
End: 2025-04-14

## 2025-04-14 DIAGNOSIS — E03.9 HYPOTHYROIDISM, UNSPECIFIED: ICD-10-CM

## 2025-04-14 DIAGNOSIS — R42 DIZZINESS AND GIDDINESS: ICD-10-CM

## 2025-04-14 PROCEDURE — 84155 ASSAY OF PROTEIN SERUM: CPT | Performed by: FAMILY MEDICINE

## 2025-04-14 PROCEDURE — 84132 ASSAY OF SERUM POTASSIUM: CPT | Performed by: FAMILY MEDICINE

## 2025-04-14 PROCEDURE — 84443 ASSAY THYROID STIM HORMONE: CPT | Performed by: FAMILY MEDICINE

## 2025-04-14 PROCEDURE — 84460 ALANINE AMINO (ALT) (SGPT): CPT | Performed by: FAMILY MEDICINE

## 2025-04-15 LAB
ALBUMIN SERPL BCG-MCNC: 4.4 G/DL (ref 3.5–5.2)
ALP SERPL-CCNC: 105 U/L (ref 40–150)
ALT SERPL W P-5'-P-CCNC: 18 U/L (ref 0–50)
ANION GAP SERPL CALCULATED.3IONS-SCNC: 10 MMOL/L (ref 7–15)
AST SERPL W P-5'-P-CCNC: 19 U/L (ref 0–45)
BILIRUB SERPL-MCNC: 0.3 MG/DL
BUN SERPL-MCNC: 11.4 MG/DL (ref 8–23)
CALCIUM SERPL-MCNC: 9.5 MG/DL (ref 8.8–10.4)
CHLORIDE SERPL-SCNC: 107 MMOL/L (ref 98–107)
CREAT SERPL-MCNC: 0.6 MG/DL (ref 0.51–0.95)
EGFRCR SERPLBLD CKD-EPI 2021: >90 ML/MIN/1.73M2
GLUCOSE SERPL-MCNC: 110 MG/DL (ref 70–99)
HCO3 SERPL-SCNC: 24 MMOL/L (ref 22–29)
POTASSIUM SERPL-SCNC: 4.5 MMOL/L (ref 3.4–5.3)
PROT SERPL-MCNC: 7.3 G/DL (ref 6.4–8.3)
SODIUM SERPL-SCNC: 141 MMOL/L (ref 135–145)
TSH SERPL DL<=0.005 MIU/L-ACNC: 2.48 UIU/ML (ref 0.3–4.2)

## 2025-04-29 ENCOUNTER — HOSPITAL ENCOUNTER (OUTPATIENT)
Dept: CT IMAGING | Facility: CLINIC | Age: 65
Discharge: HOME | End: 2025-04-29
Attending: FAMILY MEDICINE | Admitting: FAMILY MEDICINE
Payer: COMMERCIAL

## 2025-04-29 DIAGNOSIS — R42 DIZZINESS: ICD-10-CM

## 2025-04-29 PROCEDURE — 70450 CT HEAD/BRAIN W/O DYE: CPT
